# Patient Record
Sex: MALE | Race: AMERICAN INDIAN OR ALASKA NATIVE
[De-identification: names, ages, dates, MRNs, and addresses within clinical notes are randomized per-mention and may not be internally consistent; named-entity substitution may affect disease eponyms.]

---

## 2018-11-15 ENCOUNTER — HOSPITAL ENCOUNTER (INPATIENT)
Dept: HOSPITAL 31 - C.ER | Age: 58
LOS: 8 days | Discharge: NURSING, REHAB OR LONG-TERM CARE | DRG: 566 | End: 2018-11-23
Attending: INTERNAL MEDICINE | Admitting: INTERNAL MEDICINE
Payer: COMMERCIAL

## 2018-11-15 VITALS — BODY MASS INDEX: 25 KG/M2

## 2018-11-15 DIAGNOSIS — E11.69: Primary | ICD-10-CM

## 2018-11-15 DIAGNOSIS — L97.529: ICD-10-CM

## 2018-11-15 DIAGNOSIS — D69.6: ICD-10-CM

## 2018-11-15 DIAGNOSIS — E11.621: ICD-10-CM

## 2018-11-15 DIAGNOSIS — I10: ICD-10-CM

## 2018-11-15 DIAGNOSIS — M20.10: ICD-10-CM

## 2018-11-15 DIAGNOSIS — E78.00: ICD-10-CM

## 2018-11-15 DIAGNOSIS — Z87.891: ICD-10-CM

## 2018-11-15 DIAGNOSIS — Z89.411: ICD-10-CM

## 2018-11-15 DIAGNOSIS — M86.172: ICD-10-CM

## 2018-11-15 DIAGNOSIS — Z86.73: ICD-10-CM

## 2018-11-15 DIAGNOSIS — F10.129: ICD-10-CM

## 2018-11-15 LAB
BASOPHILS # BLD AUTO: 0.1 K/UL (ref 0–0.2)
BASOPHILS NFR BLD: 0.9 % (ref 0–2)
BUN SERPL-MCNC: 10 MG/DL (ref 9–20)
CALCIUM SERPL-MCNC: 8.4 MG/DL (ref 8.6–10.4)
EOSINOPHIL # BLD AUTO: 0.1 K/UL (ref 0–0.7)
EOSINOPHIL NFR BLD: 1.1 % (ref 0–4)
ERYTHROCYTE [DISTWIDTH] IN BLOOD BY AUTOMATED COUNT: 15.2 % (ref 11.5–14.5)
GFR NON-AFRICAN AMERICAN: > 60
HGB BLD-MCNC: 12.8 G/DL (ref 12–18)
LYMPHOCYTES # BLD AUTO: 1.7 K/UL (ref 1–4.3)
LYMPHOCYTES NFR BLD AUTO: 24.6 % (ref 20–40)
MCH RBC QN AUTO: 33.1 PG (ref 27–31)
MCHC RBC AUTO-ENTMCNC: 34 G/DL (ref 33–37)
MCV RBC AUTO: 97.2 FL (ref 80–94)
MONOCYTES # BLD: 0.6 K/UL (ref 0–0.8)
MONOCYTES NFR BLD: 8.8 % (ref 0–10)
NEUTROPHILS # BLD: 4.5 K/UL (ref 1.8–7)
NEUTROPHILS NFR BLD AUTO: 64.6 % (ref 50–75)
NRBC BLD AUTO-RTO: 0.1 % (ref 0–2)
PLATELET # BLD: 128 K/UL (ref 130–400)
PMV BLD AUTO: 9.3 FL (ref 7.2–11.7)
RBC # BLD AUTO: 3.88 MIL/UL (ref 4.4–5.9)
WBC # BLD AUTO: 6.9 K/UL (ref 4.8–10.8)

## 2018-11-15 RX ADMIN — TAZOBACTAM SODIUM AND PIPERACILLIN SODIUM SCH MLS/HR: 375; 3 INJECTION, SOLUTION INTRAVENOUS at 20:10

## 2018-11-15 RX ADMIN — ENOXAPARIN SODIUM SCH: 40 INJECTION SUBCUTANEOUS at 20:11
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
## 2018-11-15 NOTE — CP.PCM.CON
History of Present Illness





- History of Present Illness


History of Present Illness: 


Podiatry Consult Note for Dr. Figueroa





58M seen in ED after being sent in from Dr. Figueroa's office for left hallux 

ulceration. Patient states that he has had the ulceration on and off for months 

and that sometimes he notices bad smells coming from it. He denies any recent 

redness, malodor, or drainage. Patient states that he has had bone infections of

his feet in the past that has led to amputation. He is AAO x 3 and NAD at time 

of visit. Denies any further pedal complaints. Denies any recent 

N/V/F/C/CP/SOB/D





Review of Systems





- Review of Systems


All systems: reviewed and no additional remarkable complaints except


Review of Systems: 





as per HPI





Past Patient History





- Past Medical History & Family History


Past Medical History?: Yes





- Past Social History


Smoking Status: Smoker Currrent Status Unknown





- CARDIAC


Hx Hypercholesterolemia: Yes


Hx Hypertension: Yes





- PULMONARY


Hx Tuberculosis: No





- NEUROLOGICAL


Hx Neurological Disorder: Yes


HX Cerebrovascular Accident: Yes ("TWO STROKES")





- ENDOCRINE/METABOLIC


Hx Endocrine Disorders: Yes


Hx Diabetes Mellitus Type 2: Yes





- HEMATOLOGICAL/ONCOLOGICAL


Hx Cancer: No





- INTEGUMENTARY


Hx Dermatological Problems: Yes





- MUSCULOSKELETAL/RHEUMATOLOGICAL


Hx Musculoskeletal Disorders: Yes


Hx Falls: No


Hx Unsteady Gait: Yes


Other/Comment: w/ bilateral foot amputation, use quad cane for walking





- GASTROINTESTINAL


Hx Gastrointestinal Disorders: No





- GENITOURINARY/GYNECOLOGICAL


Hx Genitourinary Disorders: No





- PSYCHIATRIC


Hx Substance Use: No





- SURGICAL HISTORY


Hx Surgeries: Yes


Hx Herniorrhaphy: Yes


Other/Comment: W surgery





- ANESTHESIA


Hx Anesthesia: Yes


Hx Anesthesia Reactions: No





Meds


Allergies/Adverse Reactions: 


                                    Allergies











Allergy/AdvReac Type Severity Reaction Status Date / Time


 


No Known Allergies Allergy   Verified 11/15/18 15:10














- Medications


Medications: 


                               Current Medications





Enoxaparin Sodium (Lovenox)  40 mg SC DAILY [facility]


   Last Admin: 11/15/18 20:11 Dose:  Not Given


Vancomycin HCl 1 gm/ Sodium (Chloride)  250 mls @ 166.7 mls/hr IVPB Q24H [facility]; 

Protocol


Piperacillin Sod/Tazobactam Sod (Zosyn 3.375 Gm Iv Premix)  3.375 gm in 50 mls @

100 mls/hr IVPB Q8H [facility]; Protocol


   Last Admin: 11/15/18 20:10 Dose:  100 mls/hr


Pneumococcal Polyvalent Vaccine (Pneumovax 23 Vaccine)  0.5 ml IM .ONCE ONE


   Stop: 11/17/18 10:01











Physical Exam





- Constitutional


Appears: Well, Non-toxic, No Acute Distress





- Extremities Exam


Additional comments: 





LLE focused exam





Vasc: DP/PT pulses palpable 1/4. Skin temperature warm to warm from proximal to 

distal WNL. CFT < 3 seconds to all digits. Minimal edema noted to hallux


Neuro: Epicritic and protective sensation grossly diminished


Derm: Superficial ulceration with partially overlying callous formation noted to

plantar aspect of left hallux. No malodor, drainage, probe to bone, tracking, 

tunneling or undermining. No other clinical signs of infection appreciated


MSK: Minimal POP to plantar aspect of hallux. Previous partial amputation of l

eft second and third digits appreciated





- Neurological Exam


Neurological exam: Alert, Oriented x3





- Psychiatric Exam


Psychiatric exam: Normal Affect, Normal Mood





Results





- Vital Signs


Recent Vital Signs: 


                                Last Vital Signs











Temp  98.2 F   11/15/18 17:19


 


Pulse  91 H  11/15/18 17:19


 


Resp  19   11/15/18 17:19


 


BP  111/73   11/15/18 17:19


 


Pulse Ox  99   11/15/18 17:19














- Labs


Result Diagrams: 


                                 11/15/18 16:22





                                 11/15/18 16:22


Labs: 


                         Laboratory Results - last 24 hr











  11/15/18 11/15/18 11/15/18





  15:27 16:22 16:22


 


WBC   6.9 


 


RBC   3.88 L 


 


Hgb   12.8  D 


 


Hct   37.7 


 


MCV   97.2 H 


 


MCH   33.1 H 


 


MCHC   34.0 


 


RDW   15.2 H 


 


Plt Count   128 L 


 


MPV   9.3 


 


Neut % (Auto)   64.6 


 


Lymph % (Auto)   24.6 


 


Mono % (Auto)   8.8 


 


Eos % (Auto)   1.1 


 


Baso % (Auto)   0.9 


 


Neut # (Auto)   4.5 


 


Lymph # (Auto)   1.7 


 


Mono # (Auto)   0.6 


 


Eos # (Auto)   0.1 


 


Baso # (Auto)   0.1 


 


Differential Comment    


 


Sodium    143


 


Potassium    3.7


 


Chloride    101


 


Carbon Dioxide    27


 


Anion Gap    19


 


BUN    10


 


Creatinine    0.6 L


 


Est GFR ( Amer)    > 60


 


Est GFR (Non-Af Amer)    > 60


 


POC Glucose (mg/dL)  128 H  


 


Random Glucose    93


 


Calcium    8.4 L














Assessment & Plan





- Assessment and Plan (Free Text)


Assessment: 





58M seen for ulceration of plantar left hallux


Plan: 





Patient seen and evaluated


Plan discussed with Dr. Figueroa


Patient to be admitted to floors


Afebrile, absent leukocytosis


ID consult placed, recs appreciated


IV abx per ID


Wound cx pending


L foot xray: On the lateral view of the great toe plantar cortical irregu

larities concerning for contiguous osteomyelitis and contiguous cellulitis is 

suspect.  Correlate clinically.  The frontal view shows prominent exostosis of 

the medial great toe distal phalanx.


Vascular consult placed to assess vascularity to lower extremity


Possible plan for surgical intervention pending vascular studies


Wound dressed with DSD


Podiatry will continue to follow while patient in house





- Date & Time


Date: 11/15/18


Time: 21:02
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
## 2018-11-15 NOTE — RAD
PROCEDURE:  Radiographs of the left great toe.



TECHNIQUE::  AP radiograph of the left foot, with oblique and lateral 

view of the left great toe.



COMPARISON:

None.



FINDINGS:



BONES:

At the 2nd and 3rd interphalangeal joint levels just proximal to that 

amputation status is noted.  No acute fracture seen.



On the lateral view of the great toe plantar cortical irregularities 

concerning for contiguous osteomyelitis and contiguous cellulitis is 

suspect.  Correlate clinically.  The frontal view shows prominent 

exostosis of the medial great toe distal phalanx.



1st metatarsal-phalangeal joint arthrosis 



Flexion deformity of the 4th DIP joint 



JOINTS:

Normal. 



SOFT TISSUES:

Normal. 



OTHER FINDINGS:

None.



IMPRESSION:

On the lateral view of the great toe plantar cortical irregularities 

concerning for contiguous osteomyelitis and contiguous cellulitis is 

suspect.  Correlate clinically.  The frontal view shows prominent 

exostosis of the medial great toe distal phalanx.



Other findings as above. 



Comments: Study marked for PA review .

## 2018-11-15 NOTE — C.PDOC
History Of Present Illness


57 y/o male presents to ED with c/o left 1st toe diabetic ulcer for "1 year" 

worse for 1 month. Patient took unknown antibiotic and finished 1 month ago with

no improvement. Patient saw Dr. Basilio who advised patient to come to ED for 

admission. Patient denies fever, chills, trauma or any other complaints at this 

time. 


Time Seen by Provider: 11/15/18 15:26


Chief Complaint (Nursing): Abnormal Skin Integrity


History Per: Patient


History/Exam Limitations: no limitations


Onset/Duration Of Symptoms: Days


Current Symptoms Are (Timing): Still Present





Past Medical History


Reviewed: Historical Data, Nursing Documentation, Vital Signs


Vital Signs: 





                                Last Vital Signs











Temp  98.6 F   11/15/18 15:12


 


Pulse  85   11/15/18 15:12


 


Resp  18   11/15/18 15:12


 


BP  108/74   11/15/18 15:12


 


Pulse Ox  97   11/15/18 15:12














- Medical History


PMH: HTN, Hypercholesterolemia


Surgical History: No Surg Hx


Family History: States: No Known Family Hx





- Social History


Hx Tobacco Use: Yes


Hx Alcohol Use: Yes


Hx Substance Use: No





- Immunization History


Hx Tetanus Toxoid Vaccination: No


Hx Influenza Vaccination: Yes


Hx Pneumococcal Vaccination: No





Review Of Systems


Constitutional: Negative for: Fever, Chills


Musculoskeletal: Positive for: Foot Pain


Skin: Positive for: Other (ulcer to 1st left toe).  Negative for: Rash





Physical Exam





- Physical Exam


Appears: Non-toxic, No Acute Distress


Skin: Warm, Dry, No Rash


Head: Atraumatic, Normacephalic


Eye(s): bilateral: Normal Inspection


Oral Mucosa: Moist


Neck: Normal ROM, Supple


Cardiovascular: Rhythm Regular


Respiratory: Normal Breath Sounds, No Rales, No Rhonchi, No Wheezing


Extremity: Capillary Refill (<2 seconds), Other (Diabetic foot ulcerabove left 

1st toe, No lymphangitis or discharge. Multiple amputations on both feet)


Extremity: Bilateral: Normal ROM


Pulses: Left Dorsalis Pedis: Normal


Neurological/Psych: Oriented x3, Normal Speech, Normal Motor, Normal Sensation





ED Course And Treatment





- Laboratory Results


Result Diagrams: 


                                 11/15/18 16:22





                                 11/15/18 16:22


O2 Sat by Pulse Oximetry: 97 (RA)


Pulse Ox Interpretation: Normal





- Radiology


CXR: Interpreted by Me


CXR Interpretation: Yes: No Acute Disease





- Other Rad


  ** L 1 TOE


X-Ray: Interpreted by Me ( DISTAL PHALANX DECORT NO FX)





Progress





- Re-Evaluation


Re-evaluation Note: 





11/15/18 15:56


D/W POD RESIDENT WILL EVAL IN ER


11/15/18 17:01


D/W DR ASHLEY UNDERWOOD C/F PMD WILL ADMIT








- Data Reviewed


Data Reviewed: Lab, Diagnostic imaging, Old records





Disposition


Counseled Patient/Family Regarding: Studies Performed, Diagnosis





- Disposition


Disposition: HOSPITALIZED


Disposition Time: 17:01


Condition: SERIOUS


Forms:  [facility] (English)





- POA


Present On Arrival: None





- Clinical Impression


Clinical Impression: 


 Toe ulcer, Osteomyelitis








- Scribe Statement


The provider has reviewed the documentation as recorded by the Jacquelineibjo-ann Ge





All medical record entries made by the Jacquelineibjo-ann were at my direction and 

personally dictated by me. I have reviewed the chart and agree that the record 

accurately reflects my personal performance of the history, physical exam, medic

al decision making, and the department course for this patient. I have also 

personally directed, reviewed, and agree with the discharge instructions and 

disposition.

## 2018-11-15 NOTE — RAD
Date of service: 



11/15/2018



HISTORY:

 MED CLEAR 



COMPARISON:

12/9/2014



TECHNIQUE:

PA and lateral three views 



FINDINGS:



LUNGS:

Bilateral hyperaeration.  Trace left basal thread-like 

atelectasis/scarring.



No interval consolidation.



PLEURA:

No significant pleural effusion identified. No pneumothorax apparent.



CARDIOVASCULAR:

There is presence of aortic atherosclerotic calcification on x-ray.



Mild cardiomegaly no pulmonary vascular congestion. 



OSSEOUS STRUCTURES:

Exuberant thoraco lumbar spondylosis.  Calcification ossification of 

anterior longitudinal ligament.  Diffuse idiopathic skeletal 

hyperostoses DISH is inferred 



VISUALIZED UPPER ABDOMEN:

Normal.



OTHER FINDINGS:

None.



IMPRESSION:

No acute infiltrate. 



Other findings as above.

## 2018-11-16 LAB
ALBUMIN SERPL-MCNC: 4.2 G/DL (ref 3.5–5)
ALBUMIN/GLOB SERPL: 1.3 {RATIO} (ref 1–2.1)
ALT SERPL-CCNC: 39 U/L (ref 21–72)
APTT BLD: 29 SECONDS (ref 21–34)
AST SERPL-CCNC: 69 U/L (ref 17–59)
BASOPHILS # BLD AUTO: 0.1 K/UL (ref 0–0.2)
BASOPHILS NFR BLD: 0.8 % (ref 0–2)
BUN SERPL-MCNC: 10 MG/DL (ref 9–20)
CALCIUM SERPL-MCNC: 8.8 MG/DL (ref 8.6–10.4)
EOSINOPHIL # BLD AUTO: 0.2 K/UL (ref 0–0.7)
EOSINOPHIL NFR BLD: 1.8 % (ref 0–4)
ERYTHROCYTE [DISTWIDTH] IN BLOOD BY AUTOMATED COUNT: 15 % (ref 11.5–14.5)
ERYTHROCYTE [SEDIMENTATION RATE] IN BLOOD: 9 MM/HR (ref 0–15)
GFR NON-AFRICAN AMERICAN: > 60
HEPATITIS A IGM: NEGATIVE
HEPATITIS B CORE AB: NEGATIVE
HEPATITIS C ANTIBODY: NEGATIVE
HGB BLD-MCNC: 13.5 G/DL (ref 12–18)
INR PPP: 1
LYMPHOCYTES # BLD AUTO: 1.4 K/UL (ref 1–4.3)
LYMPHOCYTES NFR BLD AUTO: 15.8 % (ref 20–40)
MCH RBC QN AUTO: 33.4 PG (ref 27–31)
MCHC RBC AUTO-ENTMCNC: 34.1 G/DL (ref 33–37)
MCV RBC AUTO: 98.1 FL (ref 80–94)
MONOCYTES # BLD: 0.9 K/UL (ref 0–0.8)
MONOCYTES NFR BLD: 10.3 % (ref 0–10)
NEUTROPHILS # BLD: 6.2 K/UL (ref 1.8–7)
NEUTROPHILS NFR BLD AUTO: 71.3 % (ref 50–75)
NRBC BLD AUTO-RTO: 0 % (ref 0–2)
PLATELET # BLD: 130 K/UL (ref 130–400)
PMV BLD AUTO: 9.8 FL (ref 7.2–11.7)
PROTHROMBIN TIME: 11.2 SECONDS (ref 9.7–12.2)
RBC # BLD AUTO: 4.05 MIL/UL (ref 4.4–5.9)
WBC # BLD AUTO: 8.7 K/UL (ref 4.8–10.8)

## 2018-11-16 RX ADMIN — TAZOBACTAM SODIUM AND PIPERACILLIN SODIUM SCH MLS/HR: 375; 3 INJECTION, SOLUTION INTRAVENOUS at 19:40

## 2018-11-16 RX ADMIN — TAZOBACTAM SODIUM AND PIPERACILLIN SODIUM SCH MLS/HR: 375; 3 INJECTION, SOLUTION INTRAVENOUS at 10:00

## 2018-11-16 RX ADMIN — Medication SCH TAB: at 12:25

## 2018-11-16 RX ADMIN — TAZOBACTAM SODIUM AND PIPERACILLIN SODIUM SCH MLS/HR: 375; 3 INJECTION, SOLUTION INTRAVENOUS at 03:00

## 2018-11-16 RX ADMIN — ENOXAPARIN SODIUM SCH MG: 40 INJECTION SUBCUTANEOUS at 10:01

## 2018-11-16 NOTE — CP.PCM.PN
Subjective





- Date & Time of Evaluation


Date of Evaluation: 11/16/18


Time of Evaluation: 12:49





- Subjective


Subjective: 





Podiatry Consult Note for Dr. Figuerao





58M seen and evaluated at bedside with Dr. Figueroa for left plantar hallux 

ulceration. Patient is AAO x 3 and NAD, resting comfortably in bed at time of 

visit. Denies any pain to his foot or any new pedal complaints at this time. 

Denies any pain to his foot. Denies any recent N/V/F/C/CP/SOB/D





Objective





- Vital Signs/Intake and Output


Vital Signs (last 24 hours): 


                                        











Temp Pulse Resp BP Pulse Ox


 


 98 F   84   20   153/98 H  96 


 


 11/16/18 07:43  11/16/18 07:43  11/16/18 07:43  11/16/18 07:43  11/16/18 07:43








Intake and Output: 


                                        











 11/16/18 11/16/18





 06:59 18:59


 


Intake Total 200 


 


Balance 200 














- Medications


Medications: 


                               Current Medications





Acetaminophen (Tylenol 325mg Tab)  650 mg PO Q6 PRN


   PRN Reason: Fever >100.4 F


Amlodipine Besylate (Norvasc)  5 mg PO DAILY [facility]


   Last Admin: 11/16/18 12:25 Dose:  5 mg


Ascorbic Acid (Vitamin C 500 Mg Tab)  500 mg PO DAILY [facility]


   Last Admin: 11/16/18 12:25 Dose:  500 mg


Enoxaparin Sodium (Lovenox)  40 mg SC DAILY [facility]


   Last Admin: 11/16/18 10:01 Dose:  Not Given


Gabapentin (Neurontin)  300 mg PO HS [facility]


Vancomycin HCl 1 gm/ Sodium (Chloride)  250 mls @ 166.7 mls/hr IVPB Q24H [facility]; 

Protocol


Piperacillin Sod/Tazobactam Sod (Zosyn 3.375 Gm Iv Premix)  3.375 gm in 50 mls @

100 mls/hr IVPB Q8H [facility]; Protocol


   Last Admin: 11/16/18 10:00 Dose:  100 mls/hr


Multivitamins (Hexavitamin)  1 tab PO DAILY [facility]


   Last Admin: 11/16/18 12:25 Dose:  1 tab


Pneumococcal Polyvalent Vaccine (Pneumovax 23 Vaccine)  0.5 ml IM .ONCE ONE


   Stop: 11/17/18 10:01











- Labs


Labs: 


                                        





                                 11/16/18 11:24 





                                 11/16/18 11:24 





                                        











PT  11.2 SECONDS (9.7-12.2)   11/16/18  11:24    


 


INR  1.0   11/16/18  11:24    


 


APTT  29 SECONDS (21-34)   11/16/18  11:24    














- Constitutional


Appears: Well, Non-toxic, No Acute Distress





- Extremities Exam


Additional comments: 





LLE focused exam:





Vasc: DP/PT pulses palpable 2/4. Skin temperature warm to warm from proximal to 

distal WNL. CFT < 3 seconds to all digits. Minimal edema noted to hallux


Neuro: Epicritic and protective sensation grossly diminished


Derm: Superficial ulceration with partially overlying callous formation noted to

plantar aspect of left hallux. Minimal serous drainage appreciated. No malodor, 

probe to bone, tracking, tunneling or undermining. No other clinical signs of 

infection appreciated


MSK: Minimal POP to plantar aspect of hallux. Previous partial amputation of 

left second and third digits appreciated





- Neurological Exam


Neurological Exam: Alert, Awake, Oriented x3





- Psychiatric Exam


Psychiatric exam: Normal Affect, Normal Mood





Assessment and Plan





- Assessment and Plan (Free Text)


Assessment: 





58M seen and evaluated at bedside with Dr. Figueroa for left plantar hallux 

ulceration.


Plan: 





Patient seen and evaluated with Dr. Figueroa


Afebrile, absent leukocytosis


Continue IV abx per ID


F/u Vascular studies


F/u ESR


F/u Wound cx


L foot xray: On the lateral view of the great toe plantar cortical irre

gularities concerning for contiguous osteomyelitis and contiguous cellulitis is 

suspect.  Correlate clinically.  The frontal view shows prominent exostosis of 

the medial great toe distal phalanx.


Patient refusing MRI


No plan for surgical intervention at this time


Dr. Figueroa would like patient placed in [facility] for continued IV abx


Podiatry will continue to follow while patient in house

## 2018-11-16 NOTE — CP.PCM.PN
Subjective





- Date & Time of Evaluation


Date of Evaluation: 11/16/18


Time of Evaluation: 09:58





- Subjective


Subjective: 


Vascular Surgery Progress Note for Dr. Nails





This 58M was seen and examined this Am at bedside no acute events reported 

overnight. He reports that his legs and arms feel cold and that they hurt when 

he walks otherwise no complaints. 








Objective





- Vital Signs/Intake and Output


Vital Signs (last 24 hours): 


                                        











Temp Pulse Resp BP Pulse Ox


 


 98 F   84   20   153/98 H  96 


 


 11/16/18 07:43  11/16/18 07:43  11/16/18 07:43  11/16/18 07:43  11/16/18 07:43








Intake and Output: 


                                        











 11/16/18 11/16/18





 06:59 18:59


 


Intake Total 200 


 


Balance 200 














- Medications


Medications: 


                               Current Medications





Acetaminophen (Tylenol 325mg Tab)  650 mg PO Q6 PRN


   PRN Reason: Fever >100.4 F


Enoxaparin Sodium (Lovenox)  40 mg SC DAILY [facility]


   Last Admin: 11/15/18 20:11 Dose:  Not Given


Vancomycin HCl 1 gm/ Sodium (Chloride)  250 mls @ 166.7 mls/hr IVPB Q24H [facility]; 

Protocol


Piperacillin Sod/Tazobactam Sod (Zosyn 3.375 Gm Iv Premix)  3.375 gm in 50 mls @

100 mls/hr IVPB Q8H [facility]; Protocol


   Last Admin: 11/15/18 20:10 Dose:  100 mls/hr


Insulin Human Regular (Novolin R)  0 unit SC ACHS [facility]; Protocol


Pneumococcal Polyvalent Vaccine (Pneumovax 23 Vaccine)  0.5 ml IM .ONCE ONE


   Stop: 11/17/18 10:01











- Labs


Labs: 


                                        





                                 11/15/18 16:22 





                                 11/15/18 16:22 











- Constitutional


Appears: Non-toxic, No Acute Distress





- Head Exam


Head Exam: ATRAUMATIC, NORMOCEPHALIC





- Eye Exam


Eye Exam: EOMI





- ENT Exam


ENT Exam: Mucous Membranes Moist





- Respiratory Exam


Respiratory Exam: NORMAL BREATHING PATTERN





- Cardiovascular Exam


Cardiovascular Exam: +S1, +S2





- GI/Abdominal Exam


GI & Abdominal Exam: Soft.  absent: Tenderness





- Extremities Exam


Additional comments: 





LLE DP and PT palpable 2+ 





- Neurological Exam


Neurological Exam: Alert, Awake





- Psychiatric Exam


Psychiatric exam: Normal Affect, Normal Mood





- Skin


Skin Exam: Dry, Intact





Assessment and Plan





- Assessment and Plan (Free Text)


Assessment: 


58M with Right hallux ulcer





Followup vascular studies


Continue medical management per primary team


continue management per podiatry.


Further recs per Dr. Jesu Cisneros PGY3

## 2018-11-16 NOTE — CP.PCM.CON
History of Present Illness





- History of Present Illness


History of Present Illness: 





58M  was sent to  by his podiatrist for ulcer of L hallux. Pt states he has 

had a small ulcer on his L great toe for some time now   He has been seeing his 

podiatrist and was told to come to the hospital for further evaluation of his 

vascular disease. 








ID CONSULTED FOR ANTIBIOTIC MANAGEMENT   PODIATRY AND VASCULAR ON BOARD  IMAGING

AND CULTURES PENDING 





PMHx: HTN


PSHx: L inguinal hernia rx, R hallux amputation


SocialHx: denies smoking/EtOH/drugs





Review of Systems





- Review of Systems


All systems: reviewed and no additional remarkable complaints except





- Constitutional


Constitutional: As Per HPI





- EENT


Eyes: absent: As Per HPI, Blind Spots, Blurred Vision, Change in Vision, Decre

ased Night Vision, Diplopia, Discharge, Dry Eye, Exophthalmos, Floaters, 

Irritation, Itchy Eyes, Loss of Peripheral Vision, Pain, Photophobia, Requires 

Corrective Lenses, Sees Flashes, Spots in Vision, Tunnel Vision, Other Visual 

Disturbances, Loss of Vision, Other


Ears: absent: As Per HPI, Decreased Hearing, Ear Discharge, Ear Pain, Tinnitus, 

Abnormal Hearing, Disequilibrium, Dizziness, Other


Nose/Mouth/Throat: absent: As Per HPI, Epistaxis, Nasal Congestion, Nasal 

Discharge, Nasal Obstruction, Nasal Trauma, Nose Pain, Post Nasal Drip, Sinus P

ain, Sinus Pressure, Bleeding Gums, Change in Voice, Dental Pain, Dry Mouth, 

Dysphagia, Halitosis, Hoarsness, Lip Swelling, Mouth Lesions, Mouth Pain, 

Odynophagia, Sore Throat, Throat Swelling, Tongue Swelling, Facial Pain, Neck 

Pain, Neck Mass, Other





- Cardiovascular


Cardiovascular: absent: As Per HPI, Acrocyanosis, Chest Pain, Chest Pain at 

Rest, Chest Pain with Activity, Claudication, Diaphoresis, Dyspnea, Dyspnea on 

Exertion, Edema, Irregular Heart Rhythm, Pain Radiating to Arm/Neck/Jaw, Leg 

Edema, Leg Ulcers, Lightheadedness, Orthopnea, Palpitations, Paroxysmal 

Nocturnal Dyspnea, Pedal Edema, Radiating Pain, Rapid Heart Rate, Slow Heart 

Rate, Syncope, Other





- Respiratory


Respiratory: absent: As Per HPI, Cough, Dyspnea, Hemoptysis, Dyspnea on 

Exertion, Wheezing, Snoring, Stridor, Pain on Inspiration, Chest Congestion, 

Excessive Mucous Production, Change in Mucous Color, Pain with Coughing, Other





- Genitourinary


Genitourinary: absent: As Per HPI, Change in Urinary Stream, Difficulty 

Urinating, Dysuria, Flank Pain, Hematuria, Pyuria, Nocturia, Urinary 

Incontinence, Urinary Frequency, Urinary Hesitance, Urinary Urgency, Voiding 

Freq/Small Amts, Freq UTI, Hx Renal/Bladder Calculi, Hx /Renal Surgery, 

Bladder Distension, Other





- Musculoskeletal


Musculoskeletal: As Per HPI





- Integumentary


Integumentary: As Per HPI





- Neurological


Neurological: absent: As Per HPI, Abnormal Gait, Abnormal Hearing, Abnormal 

Movements, Abnormal Speech, Behavioral Changes, Burning Sensations, Confusion, 

Convulsions, Disequilibrium, Dizziness, Numbness, Focal Weakness, Frequent 

Falls, Headaches, Lack of Coordination, Loss of Vision, Memory Loss, 

Paresthesias, Radicular Pain, Restless Legs, Sensory Deficit, Syncope, Tingling,

Tremor, Vertigo, Weakness, Other Visual Disturbances, Other





Past Patient History





- Past Medical History & Family History


Past Medical History?: Yes





- Past Social History


Smoking Status: Smoker Currrent Status Unknown





- CARDIAC


Hx Hypertension: Yes





- PULMONARY


Hx Tuberculosis: No





- NEUROLOGICAL


Hx Neurological Disorder: Yes


HX Cerebrovascular Accident: Yes ("TWO STROKES")





- ENDOCRINE/METABOLIC


Hx Endocrine Disorders: Yes


Hx Diabetes Mellitus Type 2: Yes





- HEMATOLOGICAL/ONCOLOGICAL


Hx Cancer: No





- INTEGUMENTARY


Hx Dermatological Problems: Yes





- MUSCULOSKELETAL/RHEUMATOLOGICAL


Hx Musculoskeletal Disorders: Yes


Hx Falls: No


Hx Unsteady Gait: Yes


Other/Comment: w/ bilateral foot amputation, use quad cane for walking





- GASTROINTESTINAL


Hx Gastrointestinal Disorders: No





- GENITOURINARY/GYNECOLOGICAL


Hx Genitourinary Disorders: No





- PSYCHIATRIC


Hx Substance Use: No





- SURGICAL HISTORY


Hx Surgeries: Yes


Hx Herniorrhaphy: Yes


Other/Comment: W surgery





- ANESTHESIA


Hx Anesthesia: Yes


Hx Anesthesia Reactions: No





Meds


Allergies/Adverse Reactions: 


                                    Allergies











Allergy/AdvReac Type Severity Reaction Status Date / Time


 


No Known Allergies Allergy   Verified 11/15/18 15:10














- Medications


Medications: 


                               Current Medications





Acetaminophen (Tylenol 325mg Tab)  650 mg PO Q6 PRN


   PRN Reason: Fever >100.4 F


Amlodipine Besylate (Norvasc)  5 mg PO DAILY [facility]


   Last Admin: 11/16/18 12:25 Dose:  5 mg


Ascorbic Acid (Vitamin C 500 Mg Tab)  500 mg PO DAILY [facility]


   Last Admin: 11/16/18 12:25 Dose:  500 mg


Enoxaparin Sodium (Lovenox)  40 mg SC DAILY [facility]


   Last Admin: 11/16/18 10:01 Dose:  Not Given


Gabapentin (Neurontin)  300 mg PO HS [facility]


Vancomycin HCl 1 gm/ Sodium (Chloride)  250 mls @ 166.7 mls/hr IVPB Q24H [facility]; 

Protocol


Piperacillin Sod/Tazobactam Sod (Zosyn 3.375 Gm Iv Premix)  3.375 gm in 50 mls @

100 mls/hr IVPB Q8H [facility]; Protocol


   Last Admin: 11/16/18 10:00 Dose:  100 mls/hr


Multivitamins (Hexavitamin)  1 tab PO DAILY [facility]


   Last Admin: 11/16/18 12:25 Dose:  1 tab


Pneumococcal Polyvalent Vaccine (Pneumovax 23 Vaccine)  0.5 ml IM .ONCE ONE


   Stop: 11/17/18 10:01











Physical Exam





- Constitutional


Appears: Non-toxic, Chronically Ill





- Head Exam


Head Exam: NORMOCEPHALIC





- Eye Exam


Eye Exam: absent: Scleral icterus





- ENT Exam


ENT Exam: Mucous Membranes Dry





- Neck Exam


Neck exam: Negative for: Lymphadenopathy





- Respiratory Exam


Respiratory Exam: Decreased Breath Sounds





- Cardiovascular Exam


Cardiovascular Exam: REGULAR RHYTHM





- GI/Abdominal Exam


GI & Abdominal Exam: Diminished Bowel Sounds, Soft





- Rectal Exam


Rectal Exam: Deferred





-  Exam


 Exam: NORMAL INSPECTION





- Extremities Exam


Extremities exam: Negative for: pedal edema


Additional comments: 





+ ulcer left great toe 





- Back Exam


Back exam: absent: CVA tenderness (L), CVA tenderness (R)





- Neurological Exam


Neurological exam: Alert, CN II-XII Intact, Oriented x3, Reflexes Normal





- Psychiatric Exam


Psychiatric exam: Normal Mood





- Skin


Skin Exam: Dry





Results





- Vital Signs


Recent Vital Signs: 


                                Last Vital Signs











Temp  98 F   11/16/18 07:43


 


Pulse  84   11/16/18 07:43


 


Resp  20   11/16/18 07:43


 


BP  153/98 H  11/16/18 07:43


 


Pulse Ox  96   11/16/18 07:43














- Labs


Result Diagrams: 


                                 11/16/18 11:24





                                 11/16/18 11:24


Labs: 


                         Laboratory Results - last 24 hr











  11/15/18 11/15/18 11/15/18





  15:27 16:22 16:22


 


WBC   6.9 


 


RBC   3.88 L 


 


Hgb   12.8  D 


 


Hct   37.7 


 


MCV   97.2 H 


 


MCH   33.1 H 


 


MCHC   34.0 


 


RDW   15.2 H 


 


Plt Count   128 L 


 


MPV   9.3 


 


Neut % (Auto)   64.6 


 


Lymph % (Auto)   24.6 


 


Mono % (Auto)   8.8 


 


Eos % (Auto)   1.1 


 


Baso % (Auto)   0.9 


 


Neut # (Auto)   4.5 


 


Lymph # (Auto)   1.7 


 


Mono # (Auto)   0.6 


 


Eos # (Auto)   0.1 


 


Baso # (Auto)   0.1 


 


Differential Comment    


 


ESR   


 


PT   


 


INR   


 


APTT   


 


Sodium    143


 


Potassium    3.7


 


Chloride    101


 


Carbon Dioxide    27


 


Anion Gap    19


 


BUN    10


 


Creatinine    0.6 L


 


Est GFR ( Amer)    > 60


 


Est GFR (Non-Af Amer)    > 60


 


POC Glucose (mg/dL)  128 H  


 


Random Glucose    93


 


Hemoglobin A1c   


 


Calcium    8.4 L


 


Phosphorus   


 


Magnesium   


 


Total Bilirubin   


 


AST   


 


ALT   


 


Alkaline Phosphatase   


 


Total Protein   


 


Albumin   


 


Globulin   


 


Albumin/Globulin Ratio   














  11/15/18 11/16/18 11/16/18





  21:43 07:12 11:19


 


WBC   


 


RBC   


 


Hgb   


 


Hct   


 


MCV   


 


MCH   


 


MCHC   


 


RDW   


 


Plt Count   


 


MPV   


 


Neut % (Auto)   


 


Lymph % (Auto)   


 


Mono % (Auto)   


 


Eos % (Auto)   


 


Baso % (Auto)   


 


Neut # (Auto)   


 


Lymph # (Auto)   


 


Mono # (Auto)   


 


Eos # (Auto)   


 


Baso # (Auto)   


 


Differential Comment   


 


ESR   


 


PT   


 


INR   


 


APTT   


 


Sodium   


 


Potassium   


 


Chloride   


 


Carbon Dioxide   


 


Anion Gap   


 


BUN   


 


Creatinine   


 


Est GFR ( Amer)   


 


Est GFR (Non-Af Amer)   


 


POC Glucose (mg/dL)  118 H  91  92


 


Random Glucose   


 


Hemoglobin A1c   


 


Calcium   


 


Phosphorus   


 


Magnesium   


 


Total Bilirubin   


 


AST   


 


ALT   


 


Alkaline Phosphatase   


 


Total Protein   


 


Albumin   


 


Globulin   


 


Albumin/Globulin Ratio   














  11/16/18 11/16/18 11/16/18





  11:24 11:24 11:24


 


WBC   8.7 


 


RBC   4.05 L 


 


Hgb   13.5 


 


Hct   39.8 


 


MCV   98.1 H 


 


MCH   33.4 H 


 


MCHC   34.1 


 


RDW   15.0 H 


 


Plt Count   130 


 


MPV   9.8 


 


Neut % (Auto)   71.3 


 


Lymph % (Auto)   15.8 L 


 


Mono % (Auto)   10.3 H 


 


Eos % (Auto)   1.8 


 


Baso % (Auto)   0.8 


 


Neut # (Auto)   6.2 


 


Lymph # (Auto)   1.4 


 


Mono # (Auto)   0.9 H 


 


Eos # (Auto)   0.2 


 


Baso # (Auto)   0.1 


 


Differential Comment   


 


ESR   9 


 


PT   


 


INR   


 


APTT   


 


Sodium    139


 


Potassium    3.6


 


Chloride    98


 


Carbon Dioxide    28


 


Anion Gap    17


 


BUN    10


 


Creatinine    0.8


 


Est GFR ( Amer)    > 60


 


Est GFR (Non-Af Amer)    > 60


 


POC Glucose (mg/dL)   


 


Random Glucose    98


 


Hemoglobin A1c  5.6  


 


Calcium    8.8


 


Phosphorus    2.9


 


Magnesium    1.7


 


Total Bilirubin    0.7


 


AST    69 H


 


ALT    39


 


Alkaline Phosphatase    68


 


Total Protein    7.5


 


Albumin    4.2


 


Globulin    3.3


 


Albumin/Globulin Ratio    1.3














  11/16/18





  11:24


 


WBC 


 


RBC 


 


Hgb 


 


Hct 


 


MCV 


 


MCH 


 


MCHC 


 


RDW 


 


Plt Count 


 


MPV 


 


Neut % (Auto) 


 


Lymph % (Auto) 


 


Mono % (Auto) 


 


Eos % (Auto) 


 


Baso % (Auto) 


 


Neut # (Auto) 


 


Lymph # (Auto) 


 


Mono # (Auto) 


 


Eos # (Auto) 


 


Baso # (Auto) 


 


Differential Comment 


 


ESR 


 


PT  11.2


 


INR  1.0


 


APTT  29


 


Sodium 


 


Potassium 


 


Chloride 


 


Carbon Dioxide 


 


Anion Gap 


 


BUN 


 


Creatinine 


 


Est GFR ( Amer) 


 


Est GFR (Non-Af Amer) 


 


POC Glucose (mg/dL) 


 


Random Glucose 


 


Hemoglobin A1c 


 


Calcium 


 


Phosphorus 


 


Magnesium 


 


Total Bilirubin 


 


AST 


 


ALT 


 


Alkaline Phosphatase 


 


Total Protein 


 


Albumin 


 


Globulin 


 


Albumin/Globulin Ratio 














Assessment & Plan


(1) Osteomyelitis


Status: Acute   





(2) Toe ulcer


Status: Acute   





(3) Alcohol abuse


Status: Acute   





- Assessment and Plan (Free Text)


Assessment: 





vascular and podiatry on board  


cont iv rx as ordered


await cultures


check vanco levels

## 2018-11-16 NOTE — CP.PCM.PN
Subjective





- Date & Time of Evaluation


Date of Evaluation: 11/16/18


Time of Evaluation: 08:00





- Subjective


Subjective: 


clinically same





Objective





- Vital Signs/Intake and Output


Vital Signs (last 24 hours): 


                                        











Temp Pulse Resp BP Pulse Ox


 


 98 F   84   20   153/98 H  96 


 


 11/16/18 07:43  11/16/18 07:43  11/16/18 07:43  11/16/18 07:43  11/16/18 07:43








Intake and Output: 


                                        











 11/16/18 11/16/18





 06:59 18:59


 


Intake Total 200 


 


Balance 200 














- Medications


Medications: 


                               Current Medications





Acetaminophen (Tylenol 325mg Tab)  650 mg PO Q6 PRN


   PRN Reason: Fever >100.4 F


Amlodipine Besylate (Norvasc)  5 mg PO DAILY [facility]


   Last Admin: 11/16/18 12:25 Dose:  5 mg


Ascorbic Acid (Vitamin C 500 Mg Tab)  500 mg PO DAILY [facility]


   Last Admin: 11/16/18 12:25 Dose:  500 mg


Enoxaparin Sodium (Lovenox)  40 mg SC DAILY [facility]


   Last Admin: 11/16/18 10:01 Dose:  Not Given


Gabapentin (Neurontin)  300 mg PO HS [facility]


Vancomycin HCl 1 gm/ Sodium (Chloride)  250 mls @ 166.7 mls/hr IVPB Q24H [facility]; 

Protocol


Piperacillin Sod/Tazobactam Sod (Zosyn 3.375 Gm Iv Premix)  3.375 gm in 50 mls @

100 mls/hr IVPB Q8H [facility]; Protocol


   Last Admin: 11/16/18 10:00 Dose:  100 mls/hr


Multivitamins (Hexavitamin)  1 tab PO DAILY [facility]


   Last Admin: 11/16/18 12:25 Dose:  1 tab


Pneumococcal Polyvalent Vaccine (Pneumovax 23 Vaccine)  0.5 ml IM .ONCE ONE


   Stop: 11/17/18 10:01











- Labs


Labs: 


                                        





                                 11/16/18 11:24 





                                 11/16/18 11:24 





                                        











PT  11.2 SECONDS (9.7-12.2)   11/16/18  11:24    


 


INR  1.0   11/16/18  11:24    


 


APTT  29 SECONDS (21-34)   11/16/18  11:24    














- Constitutional


Appears: Well





- Head Exam


Head Exam: ATRAUMATIC, NORMAL INSPECTION, NORMOCEPHALIC





- Eye Exam


Eye Exam: EOMI, Normal appearance, PERRL


Pupil Exam: NORMAL ACCOMODATION, PERRL





- ENT Exam


ENT Exam: Mucous Membranes Moist, Normal Exam





- Neck Exam


Neck Exam: Full ROM, Normal Inspection.  absent: Lymphadenopathy





- Respiratory Exam


Respiratory Exam: Decreased Breath Sounds





- Cardiovascular Exam


Cardiovascular Exam: REGULAR RHYTHM, +S1, +S2





- GI/Abdominal Exam


GI & Abdominal Exam: Soft, Diminished Bowel Sounds





- Rectal Exam


Rectal Exam: Deferred

## 2018-11-16 NOTE — CP.PCM.PN
<Ruth Muller - Last Filed: 11/16/18 15:08>





Subjective





- Date & Time of Evaluation


Date of Evaluation: 11/16/18


Time of Evaluation: 15:09





- Subjective


Subjective: 





Patient admitted for Left 1st Toe foot Ulcer. 





PMHx: HTN


PSHx: Left Inguinal Hernia repair, Right Hallux amputation. 


SocialHx: Denies smoking/EtOH/illicit drug use


All: NKDA


Meds: Vitamin C 500 Daily, Percocet 5/325 PRN, Norvasc 5 Dialy, Protonix 40 

Daily, Multivitaimns, Colace 100 Daily, Vitamin B12, Folate, Gabapentin 300mg HS





Patient seen and examined at bedside. NO overnight events reported. Pain is 

controlled. Patient denies any fevers, chills, SOB, chest pain, abdominal pain, 

n/v/, changes in bowel habits, or urinary symptoms. 











Objective





- Vital Signs/Intake and Output


Vital Signs (last 24 hours): 


                                        











Temp Pulse Resp BP Pulse Ox


 


 98 F   84   20   153/98 H  96 


 


 11/16/18 07:43  11/16/18 07:43  11/16/18 07:43  11/16/18 07:43  11/16/18 07:43








Intake and Output: 


                                        











 11/16/18 11/16/18





 06:59 18:59


 


Intake Total 200 


 


Balance 200 














- Medications


Medications: 


                               Current Medications





Acetaminophen (Tylenol 325mg Tab)  650 mg PO Q6 PRN


   PRN Reason: Fever >100.4 F


Amlodipine Besylate (Norvasc)  5 mg PO DAILY [facility]


   Last Admin: 11/16/18 12:25 Dose:  5 mg


Ascorbic Acid (Vitamin C 500 Mg Tab)  500 mg PO DAILY [facility]


   Last Admin: 11/16/18 12:25 Dose:  500 mg


Enoxaparin Sodium (Lovenox)  40 mg SC DAILY [facility]


   Last Admin: 11/16/18 10:01 Dose:  Not Given


Gabapentin (Neurontin)  300 mg PO HS [facility]


Vancomycin HCl 1 gm/ Sodium (Chloride)  250 mls @ 166.7 mls/hr IVPB Q24H [facility]; 

Protocol


Piperacillin Sod/Tazobactam Sod (Zosyn 3.375 Gm Iv Premix)  3.375 gm in 50 mls @

100 mls/hr IVPB Q8H [facility]; Protocol


   Last Admin: 11/16/18 10:00 Dose:  100 mls/hr


Multivitamins (Hexavitamin)  1 tab PO DAILY [facility]


   Last Admin: 11/16/18 12:25 Dose:  1 tab


Pneumococcal Polyvalent Vaccine (Pneumovax 23 Vaccine)  0.5 ml IM .ONCE ONE


   Stop: 11/17/18 10:01











- Labs


Labs: 


                                        





                                 11/16/18 11:24 





                                 11/16/18 11:24 





                                        











PT  11.2 SECONDS (9.7-12.2)   11/16/18  11:24    


 


INR  1.0   11/16/18  11:24    


 


APTT  29 SECONDS (21-34)   11/16/18  11:24    














- Constitutional


Appears: Well, Non-toxic, No Acute Distress





- Head Exam


Head Exam: ATRAUMATIC, NORMAL INSPECTION, NORMOCEPHALIC





- Eye Exam


Eye Exam: EOMI, Normal appearance





- ENT Exam


ENT Exam: Mucous Membranes Moist





- Respiratory Exam


Respiratory Exam: Clear to Ausculation Bilateral, NORMAL BREATHING PATTERN.  

absent: Accessory Muscle Use





- Cardiovascular Exam


Cardiovascular Exam: RRR, +S1, +S2





- GI/Abdominal Exam


GI & Abdominal Exam: Soft, Normal Bowel Sounds.  absent: Tenderness





- Extremities Exam


Extremities Exam: absent: Pedal Edema


Additional comments: 


LLE focused exam:





Vasc: DP/PT pulses palpable 2/4. Skin temperature warm to warm from proximal to 

distal WNL. CFT < 3 seconds to all digits. Minimal edema noted to hallux


Neuro: Epicritic and protective sensation grossly diminished


Derm: Superficial ulceration with partially overlying callous formation noted to

plantar aspect of left hallux. Minimal serous drainage appreciated. No malodor, 

probe to bone, tracking, tunneling or undermining. No other clinical signs of 

infection appreciated


MSK: Minimal POP to plantar aspect of hallux. Previous partial amputation of 

left second and third digits appreciated





- Neurological Exam


Neurological Exam: Alert, Awake, Oriented x3





- Psychiatric Exam


Psychiatric exam: Normal Affect, Normal Mood





- Skin


Skin Exam: Dry, Intact, Normal Color, Warm





Assessment and Plan





- Assessment and Plan (Free Text)


Assessment: 


58 year old with history of HTN admitted for evaluation and treatment of Left 

1st toe Ulcer. 





Plan: 





Left Hallux Ulcer


Vascular Surgery Consulted (Dr. Nails), Recs Appreciated


ID Consulted (Dr. Del Castillo), Recs Appreciated


Podiatry Consulted (Dr. Evans), Recs appreciated 


Nursing Wound Care


Foot X-ray (Admission): On the lateral view of the great toe plantar cortical 

irregularities concerning for contiguous osteomyelitis and contiguous cellulitis

is suspect.  Correlate clinically.  The frontal view shows prominent exostosis 

of the medial great toe distal phalanx.


MRI: PENDING 


Blood Culture - PENDING | Wound Cultures - PENDING 


Meds:


   Vancomycin 1gram Daily


   Zosyn 3.375 Q8H 


   Tylenol PRN


   Vitamin C 500mg PO Daily


   Gabapentin 300mg PO HS





Elevated Liver Enzymes


Hepatitis Panel 





Hx of HTN


Meds:


   Amlodipine 5 





Proph


Lovenox


SCD's Contraindicated 


NO GI proph indicated 


Heart Healthy Diet





Patient discussed with Attending


Ruth Muller, PGY-2 














<Kam Post S - Last Filed: 11/18/18 10:36>





Objective





- Vital Signs/Intake and Output


Vital Signs (last 24 hours): 


                                        











Temp Pulse Resp BP Pulse Ox


 


 98.7 F   83   20   115/75   97 


 


 11/18/18 08:10  11/18/18 08:10  11/18/18 08:10  11/18/18 08:10  11/18/18 08:10








Intake and Output: 


                                        











 11/18/18 11/18/18





 06:59 18:59


 


Intake Total 930 500


 


Output Total 650 


 


Balance 280 500














- Medications


Medications: 


                               Current Medications





Acetaminophen (Tylenol 325mg Tab)  650 mg PO Q6 PRN


   PRN Reason: Fever >100.4 F


   Last Admin: 11/16/18 17:47 Dose:  650 mg


Amlodipine Besylate (Norvasc)  5 mg PO DAILY [facility]


   Last Admin: 11/18/18 09:40 Dose:  5 mg


Ascorbic Acid (Vitamin C 500 Mg Tab)  500 mg PO DAILY [facility]


   Last Admin: 11/18/18 09:41 Dose:  500 mg


Enoxaparin Sodium (Lovenox)  40 mg SC DAILY [facility]


   Last Admin: 11/18/18 09:41 Dose:  Not Given


Fluticasone Propionate (Flonase)  0 spr DAVE BID [facility]


   Last Admin: 11/18/18 09:42 Dose:  1 spr


Gabapentin (Neurontin)  300 mg PO HS [facility]


   Last Admin: 11/17/18 21:26 Dose:  300 mg


Vancomycin HCl 1 gm/ Sodium (Chloride)  250 mls @ 166.7 mls/hr IVPB Q24H [facility]; 

Protocol


   Last Admin: 11/17/18 17:31 Dose:  166.7 mls/hr


Piperacillin Sod/Tazobactam Sod (Zosyn 3.375 Gm Iv Premix)  3.375 gm in 50 mls @

100 mls/hr IVPB Q8H [facility]; Protocol


   Last Admin: 11/18/18 10:04 Dose:  100 mls/hr


Multivitamins (Hexavitamin)  1 tab PO DAILY [facility]


   Last Admin: 11/18/18 09:40 Dose:  1 tab











- Labs


Labs: 


                                        





                                 11/18/18 08:21 





                                 11/18/18 08:21 





                                        











PT  11.2 SECONDS (9.7-12.2)   11/16/18  11:24    


 


INR  1.0   11/16/18  11:24    


 


APTT  29 SECONDS (21-34)   11/16/18  11:24    














Assessment and Plan


(1) Osteomyelitis


Status: Acute   





(2) Toe ulcer


Status: Acute   





(3) Alcohol abuse


Status: Acute   





(4) Alcohol intoxication


Status: Acute   





(5) Hypothermia


Status: Acute   





(6) Thrombocytopenia


Status: Acute   





(7) Visit for wound check


Status: Acute   





Attending/Attestation





- Attestation


I have personally seen and examined this patient.: Yes


I have fully participated in the care of the patient.: Yes


I have reviewed all pertinent clinical information, including history, physical 

exam and plan: Yes


Notes (Text): 





11/18/18 10:36


case seen and d/w woth staff

## 2018-11-17 LAB
ALBUMIN SERPL-MCNC: 4 G/DL (ref 3.5–5)
ALBUMIN/GLOB SERPL: 1.3 {RATIO} (ref 1–2.1)
ALT SERPL-CCNC: 32 U/L (ref 21–72)
AST SERPL-CCNC: 46 U/L (ref 17–59)
BASOPHILS # BLD AUTO: 0.1 K/UL (ref 0–0.2)
BASOPHILS NFR BLD: 0.6 % (ref 0–2)
BUN SERPL-MCNC: 10 MG/DL (ref 9–20)
CALCIUM SERPL-MCNC: 9.1 MG/DL (ref 8.6–10.4)
EOSINOPHIL # BLD AUTO: 0.2 K/UL (ref 0–0.7)
EOSINOPHIL NFR BLD: 2.7 % (ref 0–4)
ERYTHROCYTE [DISTWIDTH] IN BLOOD BY AUTOMATED COUNT: 14.8 % (ref 11.5–14.5)
GFR NON-AFRICAN AMERICAN: > 60
HGB BLD-MCNC: 13.7 G/DL (ref 12–18)
LYMPHOCYTES # BLD AUTO: 1.2 K/UL (ref 1–4.3)
LYMPHOCYTES NFR BLD AUTO: 14.4 % (ref 20–40)
MCH RBC QN AUTO: 33.9 PG (ref 27–31)
MCHC RBC AUTO-ENTMCNC: 34.8 G/DL (ref 33–37)
MCV RBC AUTO: 97.3 FL (ref 80–94)
MONOCYTES # BLD: 1 K/UL (ref 0–0.8)
MONOCYTES NFR BLD: 11.4 % (ref 0–10)
NEUTROPHILS # BLD: 6.1 K/UL (ref 1.8–7)
NEUTROPHILS NFR BLD AUTO: 70.9 % (ref 50–75)
NRBC BLD AUTO-RTO: 0 % (ref 0–2)
PLATELET # BLD: 141 K/UL (ref 130–400)
PMV BLD AUTO: 10.1 FL (ref 7.2–11.7)
RBC # BLD AUTO: 4.04 MIL/UL (ref 4.4–5.9)
WBC # BLD AUTO: 8.6 K/UL (ref 4.8–10.8)

## 2018-11-17 RX ADMIN — TAZOBACTAM SODIUM AND PIPERACILLIN SODIUM SCH MLS/HR: 375; 3 INJECTION, SOLUTION INTRAVENOUS at 20:05

## 2018-11-17 RX ADMIN — TAZOBACTAM SODIUM AND PIPERACILLIN SODIUM SCH MLS/HR: 375; 3 INJECTION, SOLUTION INTRAVENOUS at 10:03

## 2018-11-17 RX ADMIN — Medication SCH TAB: at 09:48

## 2018-11-17 RX ADMIN — TAZOBACTAM SODIUM AND PIPERACILLIN SODIUM SCH MLS/HR: 375; 3 INJECTION, SOLUTION INTRAVENOUS at 03:03

## 2018-11-17 RX ADMIN — ENOXAPARIN SODIUM SCH: 40 INJECTION SUBCUTANEOUS at 09:50

## 2018-11-17 RX ADMIN — FLUTICASONE PROPIONATE SCH SPR: 50 SPRAY, METERED NASAL at 21:25

## 2018-11-17 NOTE — CP.PCM.PN
Subjective





- Date & Time of Evaluation


Date of Evaluation: 11/17/18


Time of Evaluation: 07:15





- Subjective


Subjective: 


clinically same





Objective





- Vital Signs/Intake and Output


Vital Signs (last 24 hours): 


                                        











Temp Pulse Resp BP Pulse Ox


 


 98 F   83   20   147/95 H  96 


 


 11/17/18 08:17  11/17/18 08:17  11/17/18 08:17  11/17/18 08:17  11/17/18 08:17








Intake and Output: 


                                        











 11/17/18 11/17/18





 06:59 18:59


 


Intake Total 1000 


 


Output Total 400 


 


Balance 600 














- Medications


Medications: 


                               Current Medications





Acetaminophen (Tylenol 325mg Tab)  650 mg PO Q6 PRN


   PRN Reason: Fever >100.4 F


   Last Admin: 11/16/18 17:47 Dose:  650 mg


Amlodipine Besylate (Norvasc)  5 mg PO DAILY [facility]


   Last Admin: 11/17/18 09:48 Dose:  5 mg


Ascorbic Acid (Vitamin C 500 Mg Tab)  500 mg PO DAILY [facility]


   Last Admin: 11/17/18 09:48 Dose:  500 mg


Enoxaparin Sodium (Lovenox)  40 mg SC DAILY [facility]


   Last Admin: 11/17/18 09:50 Dose:  Not Given


Fluticasone Propionate (Flonase)  0 spr DAVE BID [facility]


Gabapentin (Neurontin)  300 mg PO HS [facility]


   Last Admin: 11/16/18 21:38 Dose:  300 mg


Vancomycin HCl 1 gm/ Sodium (Chloride)  250 mls @ 166.7 mls/hr IVPB Q24H [facility]; 

Protocol


   Last Admin: 11/16/18 17:40 Dose:  166.7 mls/hr


Piperacillin Sod/Tazobactam Sod (Zosyn 3.375 Gm Iv Premix)  3.375 gm in 50 mls @

100 mls/hr IVPB Q8H [facility]; Protocol


   Last Admin: 11/17/18 10:03 Dose:  100 mls/hr


Multivitamins (Hexavitamin)  1 tab PO DAILY [facility]


   Last Admin: 11/17/18 09:48 Dose:  1 tab











- Labs


Labs: 


                                        





                                 11/17/18 07:01 





                                 11/17/18 07:01 





                                        











PT  11.2 SECONDS (9.7-12.2)   11/16/18  11:24    


 


INR  1.0   11/16/18  11:24    


 


APTT  29 SECONDS (21-34)   11/16/18  11:24    














- Constitutional


Appears: Well





- Head Exam


Head Exam: ATRAUMATIC, NORMAL INSPECTION, NORMOCEPHALIC





- Eye Exam


Eye Exam: EOMI, Normal appearance, PERRL


Pupil Exam: NORMAL ACCOMODATION, PERRL





- ENT Exam


ENT Exam: Mucous Membranes Moist, Normal Exam





- Neck Exam


Neck Exam: Full ROM, Normal Inspection.  absent: Lymphadenopathy





- Respiratory Exam


Respiratory Exam: Decreased Breath Sounds





- Cardiovascular Exam


Cardiovascular Exam: REGULAR RHYTHM, +S1, +S2





- GI/Abdominal Exam


GI & Abdominal Exam: Soft, Diminished Bowel Sounds





- Rectal Exam


Rectal Exam: Deferred





Assessment and Plan


(1) Osteomyelitis


Status: Acute   





(2) Toe ulcer


Status: Acute   





(3) Alcohol abuse


Status: Acute   





(4) Alcohol intoxication


Status: Acute   





(5) Hypothermia


Status: Acute   





(6) Thrombocytopenia


Status: Acute   





(7) Visit for wound check


Status: Acute   





- Assessment and Plan (Free Text)


Plan: 





Left Hallux Ulcer


Vascular Surgery Consulted (Dr. Nails), Recs Appreciated


ID Consulted (Dr. Del Castillo), Recs Appreciated


Podiatry Consulted (Dr. Evans), Recs appreciated 


Nursing Wound Care


Foot X-ray (Admission): On the lateral view of the great toe plantar cortical 

irregularities concerning for contiguous osteomyelitis and contiguous cellulitis

is suspect.  Correlate clinically.  The frontal view shows prominent exostosis 

of the medial great toe distal phalanx.


MRI: PENDING 


Blood Culture - PENDING | Wound Cultures - PENDING 


Meds:


   Vancomycin 1gram Daily


   Zosyn 3.375 Q8H 


   Tylenol PRN


   Vitamin C 500mg PO Daily


   Gabapentin 300mg PO HS





Elevated Liver Enzymes


Hepatitis Panel 





Hx of HTN


Meds:


   Amlodipine 5 





Proph


Lovenox


SCD's Contraindicated 


NO GI proph indicated 


Heart Healthy Diet

## 2018-11-17 NOTE — CP.PCM.PN
Subjective





- Date & Time of Evaluation


Date of Evaluation: 11/17/18


Time of Evaluation: 09:31





- Subjective


Subjective: 





Podiatry Consult Note for Dr. Figueroa





58M seen and evaluated at bedside for left plantar hallux ulceration. Seen 

resting comfortably in bed. Denies any pain to his foot or any new pedal 

complaints at this time. Denies any pain to his foot. Denies any recent 

N/V/F/C/CP/SOB/D. States he is aware of possible transfer to [facility].





Objective





- Vital Signs/Intake and Output


Vital Signs (last 24 hours): 


                                        











Temp Pulse Resp BP Pulse Ox


 


 98 F   83   20   147/95 H  96 


 


 11/17/18 08:17  11/17/18 08:17  11/17/18 08:17  11/17/18 08:17  11/17/18 08:17








Intake and Output: 


                                        











 11/17/18 11/17/18





 06:59 18:59


 


Intake Total 1000 


 


Output Total 400 


 


Balance 600 














- Medications


Medications: 


                               Current Medications





Acetaminophen (Tylenol 325mg Tab)  650 mg PO Q6 PRN


   PRN Reason: Fever >100.4 F


   Last Admin: 11/16/18 17:47 Dose:  650 mg


Amlodipine Besylate (Norvasc)  5 mg PO DAILY [facility]


   Last Admin: 11/16/18 12:25 Dose:  5 mg


Ascorbic Acid (Vitamin C 500 Mg Tab)  500 mg PO DAILY [facility]


   Last Admin: 11/16/18 12:25 Dose:  500 mg


Enoxaparin Sodium (Lovenox)  40 mg SC DAILY [facility]


   Last Admin: 11/16/18 10:01 Dose:  Not Given


Gabapentin (Neurontin)  300 mg PO [facility]


   Last Admin: 11/16/18 21:38 Dose:  300 mg


Vancomycin HCl 1 gm/ Sodium (Chloride)  250 mls @ 166.7 mls/hr IVPB Q24H [facility]; 

Protocol


   Last Admin: 11/16/18 17:40 Dose:  166.7 mls/hr


Piperacillin Sod/Tazobactam Sod (Zosyn 3.375 Gm Iv Premix)  3.375 gm in 50 mls @

100 mls/hr IVPB Q8H [facility]; Protocol


   Last Admin: 11/17/18 03:03 Dose:  100 mls/hr


Multivitamins (Hexavitamin)  1 tab PO DAILY [facility]


   Last Admin: 11/16/18 12:25 Dose:  1 tab


Pneumococcal Polyvalent Vaccine (Pneumovax 23 Vaccine)  0.5 ml IM .ONCE ONE


   Stop: 11/17/18 10:01











- Labs


Labs: 


                                        





                                 11/17/18 07:01 





                                 11/17/18 07:01 





                                        











PT  11.2 SECONDS (9.7-12.2)   11/16/18  11:24    


 


INR  1.0   11/16/18  11:24    


 


APTT  29 SECONDS (21-34)   11/16/18  11:24    














- Constitutional


Appears: Well, Non-toxic, No Acute Distress





- Head Exam


Head Exam: ATRAUMATIC, NORMOCEPHALIC





- Extremities Exam


Additional comments: 





LLE focused exam:





Vasc: DP/PT pulses palpable 2/4. Skin temperature warm to warm from proximal to 

distal WNL. CFT < 3 seconds to all digits. Minimal edema noted to hallux


Neuro: Epicritic and protective sensation grossly diminished


Derm: Superficial ulceration with partially overlying callous formation noted to

plantar aspect of left hallux. Minimal serous drainage appreciated. No malodor, 

probe to bone, tracking, tunneling or undermining. No other clinical signs of 

infection appreciated


MSK: Minimal POP to plantar aspect of hallux. Previous partial amputation of 

left second and third digits appreciated





- Neurological Exam


Neurological Exam: Alert, Awake, Oriented x3





- Psychiatric Exam


Psychiatric exam: Normal Affect, Normal Mood





Assessment and Plan





- Assessment and Plan (Free Text)


Assessment: 





58M with left plantar hallux ulceration.


Plan: 





Patient seen and evaluated with Dr. Figueroa


Afebrile, absent leukocytosis


Continue IV abx per ID


F/u Vascular studies


ESR - 9


F/u Wound cx


L foot xray: On the lateral view of the great toe plantar cortical 

irregularities concerning for contiguous osteomyelitis and contiguous cellulitis

is suspect.  Correlate clinically.  The frontal view shows prominent exostosis 

of the medial great toe distal phalanx.


Patient refusing MRI


Wound cleansed and dressed with hydrogen peroxide and DSD


No plan for surgical intervention at this time


Dr. Figueroa would like patient placed in [facility] for continued IV abx, 

patient aware and agreeable


Podiatry will continue to follow while patient in house

## 2018-11-18 LAB
ALBUMIN SERPL-MCNC: 4.1 G/DL (ref 3.5–5)
ALBUMIN/GLOB SERPL: 1.3 {RATIO} (ref 1–2.1)
ALT SERPL-CCNC: 30 U/L (ref 21–72)
AST SERPL-CCNC: 44 U/L (ref 17–59)
BASOPHILS # BLD AUTO: 0.1 K/UL (ref 0–0.2)
BASOPHILS NFR BLD: 0.7 % (ref 0–2)
BUN SERPL-MCNC: 12 MG/DL (ref 9–20)
CALCIUM SERPL-MCNC: 9.1 MG/DL (ref 8.6–10.4)
EOSINOPHIL # BLD AUTO: 0.2 K/UL (ref 0–0.7)
EOSINOPHIL NFR BLD: 2.7 % (ref 0–4)
ERYTHROCYTE [DISTWIDTH] IN BLOOD BY AUTOMATED COUNT: 14.5 % (ref 11.5–14.5)
GFR NON-AFRICAN AMERICAN: > 60
HGB BLD-MCNC: 13.7 G/DL (ref 12–18)
LYMPHOCYTES # BLD AUTO: 1.4 K/UL (ref 1–4.3)
LYMPHOCYTES NFR BLD AUTO: 18.1 % (ref 20–40)
MCH RBC QN AUTO: 33.3 PG (ref 27–31)
MCHC RBC AUTO-ENTMCNC: 34.2 G/DL (ref 33–37)
MCV RBC AUTO: 97.4 FL (ref 80–94)
MONOCYTES # BLD: 0.9 K/UL (ref 0–0.8)
MONOCYTES NFR BLD: 11.9 % (ref 0–10)
NEUTROPHILS # BLD: 5.1 K/UL (ref 1.8–7)
NEUTROPHILS NFR BLD AUTO: 66.6 % (ref 50–75)
NRBC BLD AUTO-RTO: 0 % (ref 0–2)
PLATELET # BLD: 155 K/UL (ref 130–400)
PMV BLD AUTO: 9.4 FL (ref 7.2–11.7)
RBC # BLD AUTO: 4.11 MIL/UL (ref 4.4–5.9)
WBC # BLD AUTO: 7.7 K/UL (ref 4.8–10.8)

## 2018-11-18 RX ADMIN — TAZOBACTAM SODIUM AND PIPERACILLIN SODIUM SCH MLS/HR: 375; 3 INJECTION, SOLUTION INTRAVENOUS at 10:04

## 2018-11-18 RX ADMIN — TAZOBACTAM SODIUM AND PIPERACILLIN SODIUM SCH MLS/HR: 375; 3 INJECTION, SOLUTION INTRAVENOUS at 19:29

## 2018-11-18 RX ADMIN — ENOXAPARIN SODIUM SCH: 40 INJECTION SUBCUTANEOUS at 09:41

## 2018-11-18 RX ADMIN — FLUTICASONE PROPIONATE SCH SPR: 50 SPRAY, METERED NASAL at 17:34

## 2018-11-18 RX ADMIN — TAZOBACTAM SODIUM AND PIPERACILLIN SODIUM SCH MLS/HR: 375; 3 INJECTION, SOLUTION INTRAVENOUS at 02:59

## 2018-11-18 RX ADMIN — FLUTICASONE PROPIONATE SCH SPR: 50 SPRAY, METERED NASAL at 09:42

## 2018-11-18 RX ADMIN — Medication SCH TAB: at 09:40

## 2018-11-18 NOTE — CP.PCM.PN
Subjective





- Date & Time of Evaluation


Date of Evaluation: 11/18/18


Time of Evaluation: 09:00





- Subjective


Subjective: 





58M  was sent to  by his podiatrist for ulcer of L hallux. Pt states he has 

had a small ulcer on his L great toe for some time now   He has been seeing his 

podiatrist and was told to come to the hospital for further evaluation of his 

vascular disease. 








ID CONSULTED FOR ANTIBIOTIC MANAGEMENT   PODIATRY AND VASCULAR ON BOARD  IMAGING

AND CULTURES PENDING 








Objective





- Vital Signs/Intake and Output


Vital Signs (last 24 hours): 


                                        











Temp Pulse Resp BP Pulse Ox


 


 98.2 F   68   20   130/80   98 


 


 11/18/18 16:13  11/18/18 16:13  11/18/18 16:13  11/18/18 16:13  11/18/18 16:13








Intake and Output: 


                                        











 11/18/18 11/18/18





 06:59 18:59


 


Intake Total 930 500


 


Output Total 650 


 


Balance 280 500














- Medications


Medications: 


                               Current Medications





Acetaminophen (Tylenol 325mg Tab)  650 mg PO Q6 PRN


   PRN Reason: Fever >100.4 F


   Last Admin: 11/16/18 17:47 Dose:  650 mg


Amlodipine Besylate (Norvasc)  5 mg PO DAILY [facility]


   Last Admin: 11/18/18 09:40 Dose:  5 mg


Ascorbic Acid (Vitamin C 500 Mg Tab)  500 mg PO DAILY [facility]


   Last Admin: 11/18/18 09:41 Dose:  500 mg


Enoxaparin Sodium (Lovenox)  40 mg SC DAILY [facility]


   Last Admin: 11/18/18 09:41 Dose:  Not Given


Fluticasone Propionate (Flonase)  0 spr DAVE BID [facility]


   Last Admin: 11/18/18 09:42 Dose:  1 spr


Gabapentin (Neurontin)  300 mg PO HS [facility]


   Last Admin: 11/17/18 21:26 Dose:  300 mg


Vancomycin HCl 1 gm/ Sodium (Chloride)  250 mls @ 166.7 mls/hr IVPB Q24H [facility]; 

Protocol


   Last Admin: 11/17/18 17:31 Dose:  166.7 mls/hr


Piperacillin Sod/Tazobactam Sod (Zosyn 3.375 Gm Iv Premix)  3.375 gm in 50 mls @

100 mls/hr IVPB Q8H [facility]; Protocol


   Last Admin: 11/18/18 10:04 Dose:  100 mls/hr


Multivitamins (Hexavitamin)  1 tab PO DAILY [facility]


   Last Admin: 11/18/18 09:40 Dose:  1 tab











- Labs


Labs: 


                                        





                                 11/18/18 08:21 





                                 11/18/18 08:21 





                                        











PT  11.2 SECONDS (9.7-12.2)   11/16/18  11:24    


 


INR  1.0   11/16/18  11:24    


 


APTT  29 SECONDS (21-34)   11/16/18  11:24    














- Constitutional


Appears: Non-toxic





- Head Exam


Head Exam: NORMOCEPHALIC





- Eye Exam


Eye Exam: absent: Scleral icterus





- ENT Exam


ENT Exam: Mucous Membranes Dry





- Neck Exam


Neck Exam: absent: Lymphadenopathy





- Respiratory Exam


Respiratory Exam: Decreased Breath Sounds





- Cardiovascular Exam


Cardiovascular Exam: REGULAR RHYTHM





- GI/Abdominal Exam


GI & Abdominal Exam: Distended, Soft





Assessment and Plan


(1) Osteomyelitis


Status: Acute   





(2) Toe ulcer


Status: Acute   





(3) Alcohol abuse


Status: Acute   





- Assessment and Plan (Free Text)


Assessment: 





58M  was sent to  by his podiatrist for ulcer of L hallux. Pt states he has 

had a small ulcer on his L great toe for some time now   He has been seeing his 

podiatrist and was told to come to the hospital for further evaluation of his 

vascular disease. 








ID CONSULTED FOR ANTIBIOTIC MANAGEMENT   PODIATRY AND VASCULAR ON BOARD  IMAGING

AND CULTURES PENDING

## 2018-11-18 NOTE — CP.PCM.PN
Subjective





- Date & Time of Evaluation


Date of Evaluation: 11/18/18


Time of Evaluation: 07:15





- Subjective


Subjective: 


clinically same





Objective





- Vital Signs/Intake and Output


Vital Signs (last 24 hours): 


                                        











Temp Pulse Resp BP Pulse Ox


 


 98.7 F   83   20   115/75   97 


 


 11/18/18 08:10  11/18/18 08:10  11/18/18 08:10  11/18/18 08:10  11/18/18 08:10








Intake and Output: 


                                        











 11/18/18 11/18/18





 06:59 18:59


 


Intake Total 930 500


 


Output Total 650 


 


Balance 280 500














- Medications


Medications: 


                               Current Medications





Acetaminophen (Tylenol 325mg Tab)  650 mg PO Q6 PRN


   PRN Reason: Fever >100.4 F


   Last Admin: 11/16/18 17:47 Dose:  650 mg


Amlodipine Besylate (Norvasc)  5 mg PO DAILY [facility]


   Last Admin: 11/18/18 09:40 Dose:  5 mg


Ascorbic Acid (Vitamin C 500 Mg Tab)  500 mg PO DAILY [facility]


   Last Admin: 11/18/18 09:41 Dose:  500 mg


Enoxaparin Sodium (Lovenox)  40 mg SC DAILY [facility]


   Last Admin: 11/18/18 09:41 Dose:  Not Given


Fluticasone Propionate (Flonase)  0 spr DAVE BID [facility]


   Last Admin: 11/18/18 09:42 Dose:  1 spr


Gabapentin (Neurontin)  300 mg PO HS [facility]


   Last Admin: 11/17/18 21:26 Dose:  300 mg


Vancomycin HCl 1 gm/ Sodium (Chloride)  250 mls @ 166.7 mls/hr IVPB Q24H [facility]; 

Protocol


   Last Admin: 11/17/18 17:31 Dose:  166.7 mls/hr


Piperacillin Sod/Tazobactam Sod (Zosyn 3.375 Gm Iv Premix)  3.375 gm in 50 mls @

100 mls/hr IVPB Q8H [facility]; Protocol


   Last Admin: 11/18/18 10:04 Dose:  100 mls/hr


Multivitamins (Hexavitamin)  1 tab PO DAILY [facility]


   Last Admin: 11/18/18 09:40 Dose:  1 tab











- Labs


Labs: 


                                        





                                 11/18/18 08:21 





                                 11/18/18 08:21 





                                        











PT  11.2 SECONDS (9.7-12.2)   11/16/18  11:24    


 


INR  1.0   11/16/18  11:24    


 


APTT  29 SECONDS (21-34)   11/16/18  11:24    














- Constitutional


Appears: Well





- Head Exam


Head Exam: ATRAUMATIC, NORMAL INSPECTION, NORMOCEPHALIC





- Eye Exam


Eye Exam: EOMI, Normal appearance, PERRL


Pupil Exam: NORMAL ACCOMODATION, PERRL





- ENT Exam


ENT Exam: Mucous Membranes Moist, Normal Exam





- Neck Exam


Neck Exam: Full ROM, Normal Inspection.  absent: Lymphadenopathy





- Respiratory Exam


Respiratory Exam: Decreased Breath Sounds





- Cardiovascular Exam


Cardiovascular Exam: REGULAR RHYTHM, +S1, +S2





- GI/Abdominal Exam


GI & Abdominal Exam: Tenderness, Diminished Bowel Sounds





- Rectal Exam


Rectal Exam: Deferred





Assessment and Plan


(1) Osteomyelitis


Status: Acute   





(2) Toe ulcer


Status: Acute   





(3) Alcohol abuse


Status: Acute   





(4) Alcohol intoxication


Status: Acute   





(5) Hypothermia


Status: Acute   





(6) Thrombocytopenia


Status: Acute   





(7) Visit for wound check


Status: Acute

## 2018-11-18 NOTE — CP.PCM.PN
Subjective





- Date & Time of Evaluation


Date of Evaluation: 11/18/18


Time of Evaluation: 11:16





- Subjective


Subjective: 


Vascular Surgery Progress Note for Dr. Nails





This 58M was seen and examined this Am at bedside no acute events reported 

overnight. He denies any SOB or Chest pain or any new or concerning symptoms. I 

discussed his vascular studies with him an all questions were answered. 











Objective





- Vital Signs/Intake and Output


Vital Signs (last 24 hours): 


                                        











Temp Pulse Resp BP Pulse Ox


 


 98.7 F   83   20   115/75   97 


 


 11/18/18 08:10  11/18/18 08:10  11/18/18 08:10  11/18/18 08:10  11/18/18 08:10








Intake and Output: 


                                        











 11/18/18 11/18/18





 06:59 18:59


 


Intake Total 930 500


 


Output Total 650 


 


Balance 280 500














- Medications


Medications: 


                               Current Medications





Acetaminophen (Tylenol 325mg Tab)  650 mg PO Q6 PRN


   PRN Reason: Fever >100.4 F


   Last Admin: 11/16/18 17:47 Dose:  650 mg


Amlodipine Besylate (Norvasc)  5 mg PO DAILY [facility]


   Last Admin: 11/18/18 09:40 Dose:  5 mg


Ascorbic Acid (Vitamin C 500 Mg Tab)  500 mg PO DAILY [facility]


   Last Admin: 11/18/18 09:41 Dose:  500 mg


Enoxaparin Sodium (Lovenox)  40 mg SC DAILY [facility]


   Last Admin: 11/18/18 09:41 Dose:  Not Given


Fluticasone Propionate (Flonase)  0 spr DAVE BID [facility]


   Last Admin: 11/18/18 09:42 Dose:  1 spr


Gabapentin (Neurontin)  300 mg PO HS [facility]


   Last Admin: 11/17/18 21:26 Dose:  300 mg


Vancomycin HCl 1 gm/ Sodium (Chloride)  250 mls @ 166.7 mls/hr IVPB Q24H [facility]; 

Protocol


   Last Admin: 11/17/18 17:31 Dose:  166.7 mls/hr


Piperacillin Sod/Tazobactam Sod (Zosyn 3.375 Gm Iv Premix)  3.375 gm in 50 mls @

100 mls/hr IVPB Q8H [facility]; Protocol


   Last Admin: 11/18/18 10:04 Dose:  100 mls/hr


Multivitamins (Hexavitamin)  1 tab PO DAILY [facility]


   Last Admin: 11/18/18 09:40 Dose:  1 tab











- Labs


Labs: 


                                        





                                 11/18/18 08:21 





                                 11/18/18 08:21 





                                        











PT  11.2 SECONDS (9.7-12.2)   11/16/18  11:24    


 


INR  1.0   11/16/18  11:24    


 


APTT  29 SECONDS (21-34)   11/16/18  11:24    








- Constitutional


Appears: Non-toxic, No Acute Distress





- Head Exam


Head Exam: ATRAUMATIC, NORMOCEPHALIC





- Eye Exam


Eye Exam: EOMI





- ENT Exam


ENT Exam: Mucous Membranes Moist





- Respiratory Exam


Respiratory Exam: NORMAL BREATHING PATTERN





- Cardiovascular Exam


Cardiovascular Exam: +S1, +S2





- GI/Abdominal Exam


GI & Abdominal Exam: Soft.  absent: Tenderness





- Extremities Exam


Additional comments: 





LLE DP and PT palpable 2+ 





- Neurological Exam


Neurological Exam: Alert, Awake





- Psychiatric Exam


Psychiatric exam: Normal Affect, Normal Mood





- Skin


Skin Exam: Dry, Intact





Assessment and Plan





- Assessment and Plan (Free Text)


Assessment: 


58M with Right hallux ulcer


Patient PVR show good wave forms and HARIS.





No surgical management indicated at this time.


continue management per podiatry.


Further recs per Dr. Jesu Cisneros PGY3

## 2018-11-19 LAB
ALBUMIN SERPL-MCNC: 4.3 G/DL (ref 3.5–5)
ALBUMIN/GLOB SERPL: 1.3 {RATIO} (ref 1–2.1)
ALT SERPL-CCNC: 26 U/L (ref 21–72)
AST SERPL-CCNC: 67 U/L (ref 17–59)
BASOPHILS # BLD AUTO: 0.1 K/UL (ref 0–0.2)
BASOPHILS NFR BLD: 1.3 % (ref 0–2)
BUN SERPL-MCNC: 14 MG/DL (ref 9–20)
CALCIUM SERPL-MCNC: 9.2 MG/DL (ref 8.6–10.4)
EOSINOPHIL # BLD AUTO: 0.3 K/UL (ref 0–0.7)
EOSINOPHIL NFR BLD: 4.6 % (ref 0–4)
ERYTHROCYTE [DISTWIDTH] IN BLOOD BY AUTOMATED COUNT: 14.7 % (ref 11.5–14.5)
GFR NON-AFRICAN AMERICAN: > 60
HGB BLD-MCNC: 14.1 G/DL (ref 12–18)
LYMPHOCYTES # BLD AUTO: 1.6 K/UL (ref 1–4.3)
LYMPHOCYTES NFR BLD AUTO: 21.9 % (ref 20–40)
MCH RBC QN AUTO: 33.7 PG (ref 27–31)
MCHC RBC AUTO-ENTMCNC: 34.4 G/DL (ref 33–37)
MCV RBC AUTO: 97.9 FL (ref 80–94)
MONOCYTES # BLD: 1 K/UL (ref 0–0.8)
MONOCYTES NFR BLD: 14.1 % (ref 0–10)
NEUTROPHILS # BLD: 4.2 K/UL (ref 1.8–7)
NEUTROPHILS NFR BLD AUTO: 58.1 % (ref 50–75)
NRBC BLD AUTO-RTO: 0.1 % (ref 0–2)
PLATELET # BLD: 165 K/UL (ref 130–400)
PMV BLD AUTO: 9.5 FL (ref 7.2–11.7)
RBC # BLD AUTO: 4.19 MIL/UL (ref 4.4–5.9)
WBC # BLD AUTO: 7.3 K/UL (ref 4.8–10.8)

## 2018-11-19 RX ADMIN — TAZOBACTAM SODIUM AND PIPERACILLIN SODIUM SCH MLS/HR: 375; 3 INJECTION, SOLUTION INTRAVENOUS at 10:03

## 2018-11-19 RX ADMIN — ENOXAPARIN SODIUM SCH MG: 40 INJECTION SUBCUTANEOUS at 10:02

## 2018-11-19 RX ADMIN — FLUTICASONE PROPIONATE SCH SPR: 50 SPRAY, METERED NASAL at 10:03

## 2018-11-19 RX ADMIN — FLUTICASONE PROPIONATE SCH: 50 SPRAY, METERED NASAL at 20:11

## 2018-11-19 RX ADMIN — TAZOBACTAM SODIUM AND PIPERACILLIN SODIUM SCH MLS/HR: 375; 3 INJECTION, SOLUTION INTRAVENOUS at 03:03

## 2018-11-19 RX ADMIN — Medication SCH TAB: at 10:02

## 2018-11-19 RX ADMIN — TAZOBACTAM SODIUM AND PIPERACILLIN SODIUM SCH MLS/HR: 375; 3 INJECTION, SOLUTION INTRAVENOUS at 20:07

## 2018-11-19 NOTE — CP.PCM.PN
Subjective





- Date & Time of Evaluation


Date of Evaluation: 11/19/18


Time of Evaluation: 09:00





- Subjective


Subjective: 





MRI not done


refusing PICC line


needs ceretec scan





Objective





- Vital Signs/Intake and Output


Vital Signs (last 24 hours): 


                                        











Temp Pulse Resp BP Pulse Ox


 


 97.9 F   72   20   112/75   96 


 


 11/19/18 08:18  11/19/18 08:18  11/19/18 08:18  11/19/18 08:18  11/19/18 08:18








Intake and Output: 


                                        











 11/19/18 11/19/18





 06:59 18:59


 


Intake Total 1200 


 


Output Total 850 


 


Balance 350 














- Medications


Medications: 


                               Current Medications





Acetaminophen (Tylenol 325mg Tab)  650 mg PO Q6 PRN


   PRN Reason: Fever >100.4 F


   Last Admin: 11/16/18 17:47 Dose:  650 mg


Amlodipine Besylate (Norvasc)  5 mg PO DAILY [facility]


   Last Admin: 11/19/18 10:02 Dose:  5 mg


Ascorbic Acid (Vitamin C 500 Mg Tab)  500 mg PO DAILY [facility]


   Last Admin: 11/19/18 10:02 Dose:  500 mg


Enoxaparin Sodium (Lovenox)  40 mg SC DAILY [facility]


   Last Admin: 11/19/18 10:02 Dose:  40 mg


Fluticasone Propionate (Flonase)  0 spr DAVE BID [facility]


   Last Admin: 11/19/18 10:03 Dose:  1 spr


Gabapentin (Neurontin)  300 mg PO HS [facility]


   Last Admin: 11/18/18 21:44 Dose:  300 mg


Piperacillin Sod/Tazobactam Sod (Zosyn 3.375 Gm Iv Premix)  3.375 gm in 50 mls @

100 mls/hr IVPB Q8H [facility]; Protocol


   Last Admin: 11/19/18 10:03 Dose:  100 mls/hr


Vancomycin HCl 1 gm/ Sodium (Chloride)  250 mls @ 166.7 mls/hr IVPB Q12H [facility]; 

Protocol


   Last Admin: 11/19/18 05:55 Dose:  166.7 mls/hr


Multivitamins (Hexavitamin)  1 tab PO DAILY [facility]


   Last Admin: 11/19/18 10:02 Dose:  1 tab











- Labs


Labs: 


                                        





                                 11/19/18 08:41 





                                 11/19/18 08:41 





                                        











PT  11.2 SECONDS (9.7-12.2)   11/16/18  11:24    


 


INR  1.0   11/16/18  11:24    


 


APTT  29 SECONDS (21-34)   11/16/18  11:24    














- Constitutional


Appears: Non-toxic, Chronically Ill





- Head Exam


Head Exam: NORMOCEPHALIC





- Eye Exam


Eye Exam: absent: Scleral icterus





- ENT Exam


ENT Exam: Mucous Membranes Dry





- Neck Exam


Neck Exam: absent: Lymphadenopathy





- Respiratory Exam


Respiratory Exam: Decreased Breath Sounds





- Cardiovascular Exam


Cardiovascular Exam: REGULAR RHYTHM





- GI/Abdominal Exam


GI & Abdominal Exam: Distended





Assessment and Plan


(1) Osteomyelitis


Status: Acute   





(2) Toe ulcer


Status: Acute   





(3) Alcohol abuse


Status: Acute   





- Assessment and Plan (Free Text)


Assessment: 





cont iv rx

## 2018-11-19 NOTE — CP.PCM.PN
Subjective





- Date & Time of Evaluation


Date of Evaluation: 11/19/18


Time of Evaluation: 07:15





- Subjective


Subjective: 


clinically same





Objective





- Vital Signs/Intake and Output


Vital Signs (last 24 hours): 


                                        











Temp Pulse Resp BP Pulse Ox


 


 97.9 F   66   20   120/78   96 


 


 11/19/18 16:11  11/19/18 16:11  11/19/18 16:11  11/19/18 16:11  11/19/18 16:11








Intake and Output: 


                                        











 11/19/18 11/19/18





 06:59 18:59


 


Intake Total 1200 


 


Output Total 850 


 


Balance 350 














- Medications


Medications: 


                               Current Medications





Acetaminophen (Tylenol 325mg Tab)  650 mg PO Q6 PRN


   PRN Reason: Fever >100.4 F


   Last Admin: 11/16/18 17:47 Dose:  650 mg


Amlodipine Besylate (Norvasc)  5 mg PO DAILY [facility]


   Last Admin: 11/19/18 10:02 Dose:  5 mg


Ascorbic Acid (Vitamin C 500 Mg Tab)  500 mg PO DAILY [facility]


   Last Admin: 11/19/18 10:02 Dose:  500 mg


Enoxaparin Sodium (Lovenox)  40 mg SC DAILY [facility]


   Last Admin: 11/19/18 10:02 Dose:  40 mg


Fluticasone Propionate (Flonase)  0 spr DAVE BID [facility]


   Last Admin: 11/19/18 10:03 Dose:  1 spr


Gabapentin (Neurontin)  300 mg PO HS [facility]


   Last Admin: 11/18/18 21:44 Dose:  300 mg


Piperacillin Sod/Tazobactam Sod (Zosyn 3.375 Gm Iv Premix)  3.375 gm in 50 mls @

100 mls/hr IVPB Q8H [facility]; Protocol


   Last Admin: 11/19/18 10:03 Dose:  100 mls/hr


Vancomycin HCl 1 gm/ Sodium (Chloride)  250 mls @ 166.7 mls/hr IVPB Q12H [facility]; 

Protocol


   Last Admin: 11/19/18 18:02 Dose:  166.7 mls/hr


Multivitamins (Hexavitamin)  1 tab PO DAILY [facility]


   Last Admin: 11/19/18 10:02 Dose:  1 tab











- Labs


Labs: 


                                        





                                 11/19/18 08:41 





                                 11/19/18 08:41 





                                        











PT  11.2 SECONDS (9.7-12.2)   11/16/18  11:24    


 


INR  1.0   11/16/18  11:24    


 


APTT  29 SECONDS (21-34)   11/16/18  11:24    














Assessment and Plan


(1) Osteomyelitis


Status: Acute   





(2) Toe ulcer


Status: Acute   





(3) Alcohol abuse


Status: Acute   





(4) Alcohol intoxication


Status: Acute   





(5) Hypothermia


Status: Acute   





(6) Thrombocytopenia


Status: Acute   





(7) Visit for wound check


Status: Acute

## 2018-11-19 NOTE — CP.PCM.PN
Subjective





- Date & Time of Evaluation


Date of Evaluation: 11/19/18


Time of Evaluation: 11:20





- Subjective


Subjective: 





Podiatry Progress Note for Dr. Figueroa





58M seen and evaluated at bedside for left hallux ulceration. Patient is AAO x 3

and NAD, resting comfortably in bed. Denies any acute overnight events or new 

pedal complaints. States that pain is well controlled. Denies any recent 

N/V/F/C/CP/SOB/D





Objective





- Vital Signs/Intake and Output


Vital Signs (last 24 hours): 


                                        











Temp Pulse Resp BP Pulse Ox


 


 97.9 F   72   20   112/75   96 


 


 11/19/18 08:18  11/19/18 08:18  11/19/18 08:18  11/19/18 08:18  11/19/18 08:18








Intake and Output: 


                                        











 11/19/18 11/19/18





 06:59 18:59


 


Intake Total 1200 


 


Output Total 850 


 


Balance 350 














- Medications


Medications: 


                               Current Medications





Acetaminophen (Tylenol 325mg Tab)  650 mg PO Q6 PRN


   PRN Reason: Fever >100.4 F


   Last Admin: 11/16/18 17:47 Dose:  650 mg


Amlodipine Besylate (Norvasc)  5 mg PO DAILY [facility]


   Last Admin: 11/19/18 10:02 Dose:  5 mg


Ascorbic Acid (Vitamin C 500 Mg Tab)  500 mg PO DAILY [facility]


   Last Admin: 11/19/18 10:02 Dose:  500 mg


Enoxaparin Sodium (Lovenox)  40 mg SC DAILY [facility]


   Last Admin: 11/19/18 10:02 Dose:  40 mg


Fluticasone Propionate (Flonase)  0 spr DAVE BID [facility]


   Last Admin: 11/19/18 10:03 Dose:  1 spr


Gabapentin (Neurontin)  300 mg PO HS [facility]


   Last Admin: 11/18/18 21:44 Dose:  300 mg


Piperacillin Sod/Tazobactam Sod (Zosyn 3.375 Gm Iv Premix)  3.375 gm in 50 mls @

100 mls/hr IVPB Q8H [facility]; Protocol


   Last Admin: 11/19/18 10:03 Dose:  100 mls/hr


Vancomycin HCl 1 gm/ Sodium (Chloride)  250 mls @ 166.7 mls/hr IVPB Q12H [facility]; 

Protocol


   Last Admin: 11/19/18 05:55 Dose:  166.7 mls/hr


Multivitamins (Hexavitamin)  1 tab PO DAILY [facility]


   Last Admin: 11/19/18 10:02 Dose:  1 tab











- Labs


Labs: 


                                        





                                 11/19/18 08:41 





                                 11/19/18 08:41 





                                        











PT  11.2 SECONDS (9.7-12.2)   11/16/18  11:24    


 


INR  1.0   11/16/18  11:24    


 


APTT  29 SECONDS (21-34)   11/16/18  11:24    














- Constitutional


Appears: Well, Non-toxic, No Acute Distress





- Extremities Exam


Additional comments: 





LLE focused exam:





Vasc: DP/PT pulses palpable 2/4. Skin temperature warm to warm from proximal to 

distal WNL. CFT < 3 seconds to all digits. Minimal edema noted to hallux


Neuro: Epicritic and protective sensation grossly diminished


Derm: Superficial ulceration with partial overlying callous formation noted to 

plantar aspect of left hallux. Minimal sanguinous drainage appreciated. No boby

robinson, probe to bone, tracking, tunneling or undermining. No other clinical signs 

of infection appreciated


MSK: Minimal POP to plantar aspect of hallux. Previous partial amputation of 

left second and third digits appreciated





- Neurological Exam


Neurological Exam: Alert, Awake, Oriented x3





- Psychiatric Exam


Psychiatric exam: Normal Affect, Normal Mood





Assessment and Plan





- Assessment and Plan (Free Text)


Assessment: 





58M seen and evaluated at bedside for left hallux ulceration


Plan: 





Patient seen and evaluated


Plan discussed with Dr. Figueroa


Afebrile, absent leukocytosis


Continue abx per ID


Wound cx toe: Morg Pacoanii Ss Morganii


Foot xray: On the lateral view of the great toe plantar cortical irregularities 

concerning for contiguous osteomyelitis and contiguous cellulitis is suspect.  

Correlate clinically.  The frontal view shows prominent exostosis of the medial 

great toe distal phalanx


ESR: 9


LE US: Read pending


Callous formation sharply debrided using #10 blade without incident. Underlying 

tissue noted to be 100% granular with healthy bleeding appreciated


Wound dressed with hydrogen peroxide soaked gauze, DSD


No plan for surgical intervention at this time


Per Dr. Figueroa patient should be treated with abx in rehab facility


Podiatry will continue to follow while patient in house

## 2018-11-19 NOTE — VASCLAB
Date of service: 



11/16/2018



STUDY DESCRIPTION:  Lower Extremity Arterial Exam (PVR).



HISTORY:

 eval for PVD 



PRIORS:

None. 



TECHNIQUE:

Pulse volume recording waveforms and segmental pressures of bilateral 

lower extremities at multiple levels were obtained. Ankle Brachial 

Indices (ABIs) were calculated.



Report prepared by  TIAGO Calvillo, RVT



RIGHT LOWER EXTREMITY:  

* Brachial artery: Pressure - 154 mmHg. 



* High thigh: Pressure - 182 mmHg: Ratio - 1.18: PVR waveform -  

Pulsatile



* Low thigh: Pressure - 185 mmHg: Ratio -  1.20 PVR waveform:  

Pulsatile



* Calf: Pressure - 174 mmHg: Ratio - 1.13 PVR waveform:  Pulsatile



* Posterior tibial Artery: Pressure - 182 mmHg: Ratio - 1.18 PVR 

waveform:  Pulsatile  



* Dorsalis pedis Artery: Pressure - 191 mmHg: Ratio - 1.24 PVR 

waveform:  Pulsatile



* Great toe: Pressure -  mmHg: Ratio -  PVR waveform:  



Ankle brachial index (HARIS): 1.24



LEFT LOWER EXTREMITY:  

* Brachial artery: Pressure - 153 mmHg. 



* High thigh: Pressure - 175 mmHg: Ratio - 1.14: PVR waveform -  

Pulsatile



* Low thigh: Pressure - 184 mmHg: Ratio - 1.19 PVR waveform:  

Pulsatile



* Calf: Pressure - 190 mmHg: Ratio - 1.23 PVR waveform:  Pulsatile 



* Posterior tibial Artery: Pressure - 164 mmHg: Ratio - 1.06 PVR 

waveform:  Pulsatile



* Dorsalis pedis Artery: Pressure - 187 mmHg: Ratio - 1.21 PVR 

waveform:   Pulsatile



* Great toe: Pressure -  mmHg: Ratio -  PVR waveform:   



Ankle brachial index (HARIS): 1.21



OTHER FINDINGS:  Right: 



Left:  



IMPRESSION:

Right: There was no evidence of hemodynamically significant arterial 

insufficiency in the right lower extremity.



Left: There was no evidence of hemodynamically significant arterial 

insufficiency in the left lower extremity.

## 2018-11-20 LAB
ALBUMIN SERPL-MCNC: 4.6 G/DL (ref 3.5–5)
ALBUMIN/GLOB SERPL: 1.2 {RATIO} (ref 1–2.1)
ALT SERPL-CCNC: 37 U/L (ref 21–72)
AST SERPL-CCNC: 62 U/L (ref 17–59)
BASOPHILS # BLD AUTO: 0.1 K/UL (ref 0–0.2)
BASOPHILS NFR BLD: 0.9 % (ref 0–2)
BUN SERPL-MCNC: 17 MG/DL (ref 9–20)
CALCIUM SERPL-MCNC: 9.5 MG/DL (ref 8.6–10.4)
EOSINOPHIL # BLD AUTO: 0.3 K/UL (ref 0–0.7)
EOSINOPHIL NFR BLD: 4.1 % (ref 0–4)
ERYTHROCYTE [DISTWIDTH] IN BLOOD BY AUTOMATED COUNT: 14.7 % (ref 11.5–14.5)
GFR NON-AFRICAN AMERICAN: > 60
HGB BLD-MCNC: 14.8 G/DL (ref 12–18)
LYMPHOCYTES # BLD AUTO: 1.9 K/UL (ref 1–4.3)
LYMPHOCYTES NFR BLD AUTO: 24.6 % (ref 20–40)
MCH RBC QN AUTO: 33.4 PG (ref 27–31)
MCHC RBC AUTO-ENTMCNC: 34 G/DL (ref 33–37)
MCV RBC AUTO: 98.4 FL (ref 80–94)
MONOCYTES # BLD: 1.2 K/UL (ref 0–0.8)
MONOCYTES NFR BLD: 16.1 % (ref 0–10)
NEUTROPHILS # BLD: 4.1 K/UL (ref 1.8–7)
NEUTROPHILS NFR BLD AUTO: 54.3 % (ref 50–75)
NRBC BLD AUTO-RTO: 0 % (ref 0–2)
PLATELET # BLD: 204 K/UL (ref 130–400)
PMV BLD AUTO: 9.6 FL (ref 7.2–11.7)
RBC # BLD AUTO: 4.44 MIL/UL (ref 4.4–5.9)
WBC # BLD AUTO: 7.6 K/UL (ref 4.8–10.8)

## 2018-11-20 RX ADMIN — Medication SCH TAB: at 09:48

## 2018-11-20 RX ADMIN — FLUTICASONE PROPIONATE SCH: 50 SPRAY, METERED NASAL at 09:50

## 2018-11-20 RX ADMIN — TAZOBACTAM SODIUM AND PIPERACILLIN SODIUM SCH MLS/HR: 375; 3 INJECTION, SOLUTION INTRAVENOUS at 10:46

## 2018-11-20 RX ADMIN — FLUTICASONE PROPIONATE SCH: 50 SPRAY, METERED NASAL at 17:44

## 2018-11-20 RX ADMIN — ENOXAPARIN SODIUM SCH: 40 INJECTION SUBCUTANEOUS at 09:48

## 2018-11-20 RX ADMIN — TAZOBACTAM SODIUM AND PIPERACILLIN SODIUM SCH MLS/HR: 375; 3 INJECTION, SOLUTION INTRAVENOUS at 18:30

## 2018-11-20 RX ADMIN — TAZOBACTAM SODIUM AND PIPERACILLIN SODIUM SCH MLS/HR: 375; 3 INJECTION, SOLUTION INTRAVENOUS at 02:57

## 2018-11-20 NOTE — CP.PCM.PN
Objective





- Vital Signs/Intake and Output


Vital Signs (last 24 hours): 


                                        











Temp Pulse Resp BP Pulse Ox


 


 97.7 F   68   20   126/79   97 


 


 11/20/18 07:44  11/20/18 07:44  11/20/18 07:44  11/20/18 07:44  11/20/18 07:44








Intake and Output: 


                                        











 11/20/18 11/20/18





 06:59 18:59


 


Intake Total 1330 


 


Output Total 850 


 


Balance 480 














- Medications


Medications: 


                               Current Medications





Acetaminophen (Tylenol 325mg Tab)  650 mg PO Q6 PRN


   PRN Reason: Fever >100.4 F


   Last Admin: 11/16/18 17:47 Dose:  650 mg


Amlodipine Besylate (Norvasc)  5 mg PO DAILY [facility]


   Last Admin: 11/20/18 09:48 Dose:  5 mg


Ascorbic Acid (Vitamin C 500 Mg Tab)  500 mg PO DAILY [facility]


   Last Admin: 11/20/18 09:48 Dose:  500 mg


Enoxaparin Sodium (Lovenox)  40 mg SC DAILY [facility]


   Last Admin: 11/20/18 09:48 Dose:  Not Given


Fluticasone Propionate (Flonase)  0 spr DAVE BID [facility]


   Last Admin: 11/20/18 09:50 Dose:  Not Given


Gabapentin (Neurontin)  300 mg PO HS [facility]


   Last Admin: 11/19/18 21:30 Dose:  300 mg


Piperacillin Sod/Tazobactam Sod (Zosyn 3.375 Gm Iv Premix)  3.375 gm in 50 mls @

100 mls/hr IVPB Q8H [facility]; Protocol


   Last Admin: 11/20/18 10:46 Dose:  100 mls/hr


Vancomycin HCl 1 gm/ Sodium (Chloride)  250 mls @ 166.7 mls/hr IVPB Q12H [facility]; 

Protocol


   Last Admin: 11/20/18 05:01 Dose:  166.7 mls/hr


Multivitamins (Hexavitamin)  1 tab PO DAILY [facility]


   Last Admin: 11/20/18 09:48 Dose:  1 tab











- Labs


Labs: 


                                        





                                 11/20/18 08:35 





                                 11/20/18 08:35 





                                        











PT  11.2 SECONDS (9.7-12.2)   11/16/18  11:24    


 


INR  1.0   11/16/18  11:24    


 


APTT  29 SECONDS (21-34)   11/16/18  11:24    














Assessment and Plan





- Assessment and Plan (Free Text)


Assessment: 





58 year old male admitted with left toe ulcer, seen and examined. Alert and 

orientedx3, denies acute pain or distress. MRI positive for OM, advised for 6 

weeks iv antibiotics but he is very afraid of PICC line placement. Will send to 

[place] with heplock in place as per DR ASHLEY Post. He has good veins. To be 

followed up by DR Basilio.

## 2018-11-20 NOTE — CP.PCM.PN
Subjective





- Date & Time of Evaluation


Date of Evaluation: 11/20/18


Time of Evaluation: 08:00





- Subjective


Subjective: 





MRI pending





Objective





- Vital Signs/Intake and Output


Vital Signs (last 24 hours): 


                                        











Temp Pulse Resp BP Pulse Ox


 


 97.7 F   68   20   126/79   97 


 


 11/20/18 07:44  11/20/18 07:44  11/20/18 07:44  11/20/18 07:44  11/20/18 07:44








Intake and Output: 


                                        











 11/20/18 11/20/18





 06:59 18:59


 


Intake Total 1330 


 


Output Total 850 


 


Balance 480 














- Medications


Medications: 


                               Current Medications





Acetaminophen (Tylenol 325mg Tab)  650 mg PO Q6 PRN


   PRN Reason: Fever >100.4 F


   Last Admin: 11/16/18 17:47 Dose:  650 mg


Amlodipine Besylate (Norvasc)  5 mg PO DAILY [facility]


   Last Admin: 11/20/18 09:48 Dose:  5 mg


Ascorbic Acid (Vitamin C 500 Mg Tab)  500 mg PO DAILY [facility]


   Last Admin: 11/20/18 09:48 Dose:  500 mg


Enoxaparin Sodium (Lovenox)  40 mg SC DAILY [facility]


   Last Admin: 11/20/18 09:48 Dose:  Not Given


Fluticasone Propionate (Flonase)  0 spr DAVE BID [facility]


   Last Admin: 11/20/18 09:50 Dose:  Not Given


Gabapentin (Neurontin)  300 mg PO HS [facility]


   Last Admin: 11/19/18 21:30 Dose:  300 mg


Piperacillin Sod/Tazobactam Sod (Zosyn 3.375 Gm Iv Premix)  3.375 gm in 50 mls @

100 mls/hr IVPB Q8H [facility]; Protocol


   Last Admin: 11/20/18 10:46 Dose:  100 mls/hr


Vancomycin HCl 1 gm/ Sodium (Chloride)  250 mls @ 166.7 mls/hr IVPB Q12H [facility]; 

Protocol


   Last Admin: 11/20/18 05:01 Dose:  166.7 mls/hr


Multivitamins (Hexavitamin)  1 tab PO DAILY [facility]


   Last Admin: 11/20/18 09:48 Dose:  1 tab











- Labs


Labs: 


                                        





                                 11/20/18 08:35 





                                 11/20/18 08:35 





                                        











PT  11.2 SECONDS (9.7-12.2)   11/16/18  11:24    


 


INR  1.0   11/16/18  11:24    


 


APTT  29 SECONDS (21-34)   11/16/18  11:24    














- Constitutional


Appears: Non-toxic, Chronically Ill





- Head Exam


Head Exam: NORMOCEPHALIC





- Eye Exam


Eye Exam: absent: Scleral icterus





- ENT Exam


ENT Exam: Mucous Membranes Dry





- Neck Exam


Neck Exam: absent: Lymphadenopathy





- Respiratory Exam


Respiratory Exam: Decreased Breath Sounds





- Cardiovascular Exam


Cardiovascular Exam: REGULAR RHYTHM





- GI/Abdominal Exam


GI & Abdominal Exam: Distended, Soft





- Rectal Exam


Rectal Exam: Deferred





-  Exam


 Exam: NORMAL INSPECTION





- Extremities Exam


Extremities Exam: Pedal Edema





- Back Exam


Back Exam: absent: CVA tenderness (L), CVA tenderness (R)





- Neurological Exam


Neurological Exam: Alert, Awake, CN II-XII Intact





Assessment and Plan


(1) Osteomyelitis


Status: Acute   





(2) Toe ulcer


Status: Acute   





(3) Alcohol abuse


Status: Acute   





- Assessment and Plan (Free Text)


Assessment: 





cont iv rx  possible long term rx

## 2018-11-20 NOTE — MRI
MRI left forefoot 



HISTORY:

Ulcer.  Evaluate for osteomyelitis. 



Comparison: X-ray dated 11/15/2018 



Technique: Multi-echo multiplanar sequences were performed through 

the left forefoot without and with the use of intravenous contrast. 



Findings: 



Ulceration with soft tissue defect noted at the volar aspect of the 

1st distal phalanx.   Adjacent signal abnormality noted within the 

1st distal phalanx with decreased T1 signal and increased STIR signal 

consistent with an acute osteomyelitis. Fluid noted at the 1st 

interphalangeal joint space. 



Curvilinear serpiginous signal abnormality seen within the proximal 

and midportion of the 1st proximal phalanx demonstrating serpiginous 

areas of increased STIR signal with central areas of decreased T1 

signal concerning for a bone infarct / focal osteonecrosis versus 

additional etiology.  Superimposed acute infectious and or 

inflammatory changes at this level cannot be excluded.  Clinical 

correlation. 



Moderate hallux valgus deformity. 



Resection of the 2nd and 3rd digits to the level of the heads of the 

proximal phalanges. 



Incidentally noted is signal abnormality within the head of the 4th 

middle phalanx demonstrating decreased T1 signal and increased STIR 

signal which may represent prominent osteochondral change; however, 

acute infectious and or inflammatory changes at this level cannot be 

excluded.  Clinical correlation. 



Impression:



1. Ulceration with soft tissue defect noted at the volar aspect of 

the 1st distal phalanx.   Adjacent signal abnormality noted within 

the 1st distal phalanx with decreased T1 signal and increased STIR 

signal consistent with an acute osteomyelitis. Fluid noted at the 1st 

interphalangeal joint space. 



2. Curvilinear serpiginous signal abnormality seen within the 

proximal and midportion of the 1st proximal phalanx demonstrating 

serpiginous areas of increased STIR signal with central areas of 

decreased T1 signal concerning for a bone infarct / focal 

osteonecrosis versus additional etiology.  Superimposed acute 

infectious and or inflammatory changes at this level cannot be 

excluded.  Clinical correlation. 



3. Moderate hallux valgus deformity. 



4. Resection of the 2nd and 3rd digits to the level of the heads of 

the proximal phalanges. 



5. Incidentally noted is signal abnormality within the head of the 

4th middle phalanx demonstrating decreased T1 signal and increased 

STIR signal which may represent prominent osteochondral change; 

however, acute infectious and or inflammatory changes at this level 

cannot be excluded.  Clinical correlation.

## 2018-11-20 NOTE — CP.PCM.PN
Subjective





- Date & Time of Evaluation


Date of Evaluation: 11/20/18


Time of Evaluation: 07:15





- Subjective


Subjective: 


clinically same





Objective





- Vital Signs/Intake and Output


Vital Signs (last 24 hours): 


                                        











Temp Pulse Resp BP Pulse Ox


 


 97.7 F   68   20   126/79   97 


 


 11/20/18 07:44  11/20/18 07:44  11/20/18 07:44  11/20/18 07:44  11/20/18 07:44








Intake and Output: 


                                        











 11/20/18 11/20/18





 06:59 18:59


 


Intake Total 1330 


 


Output Total 850 


 


Balance 480 














- Medications


Medications: 


                               Current Medications





Acetaminophen (Tylenol 325mg Tab)  650 mg PO Q6 PRN


   PRN Reason: Fever >100.4 F


   Last Admin: 11/16/18 17:47 Dose:  650 mg


Amlodipine Besylate (Norvasc)  [facility]ILY [facility]


   Last Admin: 11/20/18 09:48 Dose:  5 mg


Ascorbic Acid (Vitamin C 500 Mg Tab)  500 mg PO [facility]ILY [facility]


   Last Admin: 11/20/18 09:48 Dose:  500 mg


Enoxaparin Sodium (Lovenox)  40 mg SC [facility]ILY [facility]


   Last Admin: 11/20/18 09:48 Dose:  Not Given


Fluticasone Propionate (Flonase)  0 spr DAVE BID [facility]


   Last Admin: 11/20/18 09:50 Dose:  Not Given


Gabapentin (Neurontin)  300 mg PO HS [facility]


   Last Admin: 11/19/18 21:30 Dose:  300 mg


Piperacillin Sod/Tazobactam Sod (Zosyn 3.375 Gm Iv Premix)  3.375 gm in 50 mls @

100 mls/hr IVPB Q8H [facility]; Protocol


   Last Admin: 11/20/18 10:46 Dose:  100 mls/hr


Vancomycin HCl 1 gm/ Sodium (Chloride)  250 mls @ 166.7 mls/hr IVPB Q12H [facility]; 

Protocol


   Last Admin: 11/20/18 05:01 Dose:  166.7 mls/hr


Multivitamins (Hexavitamin)  1 tab PO DAILY [facility]


   Last Admin: 11/20/18 09:48 Dose:  1 tab











- Labs


Labs: 


                                        





                                 11/20/18 08:35 





                                 11/20/18 08:35 





                                        











PT  11.2 SECONDS (9.7-12.2)   11/16/18  11:24    


 


INR  1.0   11/16/18  11:24    


 


APTT  29 SECONDS (21-34)   11/16/18  11:24    














- Constitutional


Appears: Well





- Head Exam


Head Exam: ATRAUMATIC, NORMAL INSPECTION, NORMOCEPHALIC





- Eye Exam


Eye Exam: EOMI, Normal appearance, PERRL


Pupil Exam: NORMAL ACCOMODATION, PERRL





- ENT Exam


ENT Exam: Mucous Membranes Moist, Normal Exam





- Neck Exam


Neck Exam: Full ROM, Normal Inspection.  absent: Lymphadenopathy





- Respiratory Exam


Respiratory Exam: Decreased Breath Sounds





- Cardiovascular Exam


Cardiovascular Exam: REGULAR RHYTHM, +S1, +S2





- GI/Abdominal Exam


GI & Abdominal Exam: Soft, Diminished Bowel Sounds





- Rectal Exam


Rectal Exam: Deferred





Assessment and Plan


(1) Osteomyelitis


Status: Acute   





(2) Toe ulcer


Status: Acute   





(3) Alcohol abuse


Status: Acute   





(4) Alcohol intoxication


Status: Acute   





(5) Hypothermia


Status: Acute   





(6) Thrombocytopenia


Status: Acute   





(7) Visit for wound check


Status: Acute

## 2018-11-21 LAB
ALBUMIN SERPL-MCNC: 4.4 G/DL (ref 3.5–5)
ALBUMIN/GLOB SERPL: 1.3 {RATIO} (ref 1–2.1)
ALT SERPL-CCNC: 40 U/L (ref 21–72)
AST SERPL-CCNC: 54 U/L (ref 17–59)
BASOPHILS # BLD AUTO: 0.1 K/UL (ref 0–0.2)
BASOPHILS NFR BLD: 1 % (ref 0–2)
BUN SERPL-MCNC: 17 MG/DL (ref 9–20)
CALCIUM SERPL-MCNC: 9.3 MG/DL (ref 8.6–10.4)
EOSINOPHIL # BLD AUTO: 0.3 K/UL (ref 0–0.7)
EOSINOPHIL NFR BLD: 4.2 % (ref 0–4)
ERYTHROCYTE [DISTWIDTH] IN BLOOD BY AUTOMATED COUNT: 14.4 % (ref 11.5–14.5)
GFR NON-AFRICAN AMERICAN: > 60
HGB BLD-MCNC: 14.1 G/DL (ref 12–18)
LYMPHOCYTES # BLD AUTO: 2 K/UL (ref 1–4.3)
LYMPHOCYTES NFR BLD AUTO: 31.2 % (ref 20–40)
MCH RBC QN AUTO: 33.2 PG (ref 27–31)
MCHC RBC AUTO-ENTMCNC: 34 G/DL (ref 33–37)
MCV RBC AUTO: 97.7 FL (ref 80–94)
MONOCYTES # BLD: 1.2 K/UL (ref 0–0.8)
MONOCYTES NFR BLD: 18.3 % (ref 0–10)
NEUTROPHILS # BLD: 2.9 K/UL (ref 1.8–7)
NEUTROPHILS NFR BLD AUTO: 45.3 % (ref 50–75)
NRBC BLD AUTO-RTO: 0.1 % (ref 0–2)
PLATELET # BLD: 214 K/UL (ref 130–400)
PMV BLD AUTO: 9.4 FL (ref 7.2–11.7)
RBC # BLD AUTO: 4.24 MIL/UL (ref 4.4–5.9)
WBC # BLD AUTO: 6.4 K/UL (ref 4.8–10.8)

## 2018-11-21 RX ADMIN — ENOXAPARIN SODIUM SCH: 40 INJECTION SUBCUTANEOUS at 09:56

## 2018-11-21 RX ADMIN — FLUTICASONE PROPIONATE SCH: 50 SPRAY, METERED NASAL at 09:56

## 2018-11-21 RX ADMIN — TAZOBACTAM SODIUM AND PIPERACILLIN SODIUM SCH MLS/HR: 375; 3 INJECTION, SOLUTION INTRAVENOUS at 11:28

## 2018-11-21 RX ADMIN — CEFEPIME SCH MLS/HR: 1 INJECTION, SOLUTION INTRAVENOUS at 16:21

## 2018-11-21 RX ADMIN — FLUTICASONE PROPIONATE SCH: 50 SPRAY, METERED NASAL at 17:10

## 2018-11-21 RX ADMIN — TAZOBACTAM SODIUM AND PIPERACILLIN SODIUM SCH MLS/HR: 375; 3 INJECTION, SOLUTION INTRAVENOUS at 02:23

## 2018-11-21 RX ADMIN — Medication SCH MG: at 17:09

## 2018-11-21 RX ADMIN — Medication SCH TAB: at 09:56

## 2018-11-21 NOTE — CP.PCM.PN
Subjective





- Date & Time of Evaluation


Date of Evaluation: 11/21/18


Time of Evaluation: 16:41





- Subjective


Subjective: 





Podiatry Progress Note for Dr. Figueroa





58M seen at bedside for left hallux ulceration. Patient is AAO x 3 and NAD, 

resting comfortably in bed. Denies any pain to foot, new pedal complaints or 

overnight events. Denies any recent N/V/F/C/CP/SOB/D





Objective





- Vital Signs/Intake and Output


Vital Signs (last 24 hours): 


                                        











Temp Pulse Resp BP Pulse Ox


 


 98.3 F   67   20   125/77   98 


 


 11/21/18 15:15  11/21/18 15:15  11/21/18 15:15  11/21/18 15:15  11/21/18 15:15








Intake and Output: 


                                        











 11/21/18 11/21/18





 06:59 18:59


 


Intake Total 750 


 


Balance 750 














- Medications


Medications: 


                               Current Medications





Acetaminophen (Tylenol 325mg Tab)  650 mg PO Q6 PRN


   PRN Reason: Fever >100.4 F


   Last Admin: 11/16/18 17:47 Dose:  650 mg


Amlodipine Besylate (Norvasc)  5 mg PO DAILY [facility]


   Last Admin: 11/21/18 09:56 Dose:  5 mg


Ascorbic Acid (Vitamin C 500 Mg Tab)  500 mg PO DAILY [facility]


   Last Admin: 11/21/18 09:56 Dose:  500 mg


Enoxaparin Sodium (Lovenox)  40 mg SC DAILY [facility]


   Last Admin: 11/21/18 09:56 Dose:  Not Given


Fluticasone Propionate (Flonase)  0 spr DAVE BID [facility]


   Last Admin: 11/21/18 09:56 Dose:  Not Given


Gabapentin (Neurontin)  300 mg PO HS [facility]


   Last Admin: 11/20/18 21:51 Dose:  300 mg


Vancomycin HCl 1 gm/ Sodium (Chloride)  250 mls @ 166.7 mls/hr IVPB Q12H [facility]; 

Protocol


   Last Admin: 11/21/18 04:22 Dose:  166.7 mls/hr


Cefepime HCl (Maxipime Iv 1 Gm Premix)  1 gm in 50 mls @ 100 mls/hr IVPB Q12H 

[facility]; Protocol


   Last Admin: 11/21/18 16:21 Dose:  100 mls/hr


Multivitamins (Hexavitamin)  1 tab PO DAILY [facility]


   Last Admin: 11/21/18 09:56 Dose:  1 tab


Saccharomyces Boulardii (Florastor)  250 mg PO BID [facility]











- Labs


Labs: 


                                        





                                 11/21/18 06:17 





                                 11/21/18 06:17 





                                        











PT  11.2 SECONDS (9.7-12.2)   11/16/18  11:24    


 


INR  1.0   11/16/18  11:24    


 


APTT  29 SECONDS (21-34)   11/16/18  11:24    














- Constitutional


Appears: Well, Non-toxic, No Acute Distress





- Extremities Exam


Additional comments: 





LLE focused exam:





Vasc: DP/PT pulses palpable 2/4. Skin temperature warm to warm from proximal to 

distal WNL. CFT < 3 seconds to all digits. Minimal edema noted to hallux


Neuro: Epicritic and protective sensation grossly diminished


Derm: Superficial ulceration with partial overlying callous formation noted to 

plantar aspect of left hallux. No drainage appreciated. No malodor, probe to 

bone, tracking, tunneling or undermining. No other clinical signs of infection 

appreciated


MSK: Minimal POP to plantar aspect of hallux. Previous partial amputation of 

left second and third digits appreciated





- Neurological Exam


Neurological Exam: Alert, Awake, Oriented x3





- Psychiatric Exam


Psychiatric exam: Normal Affect, Normal Mood





Assessment and Plan





- Assessment and Plan (Free Text)


Assessment: 





58M seen at bedside for left hallux ulceration


Plan: 





Patient seen and evaluated


Plan discussed with Dr. Figueroa


Afebrile, absent leukocytosis


Continue IV abx per ID


Wound cx toe: Bridgett Lord Ss Morganii


Foot xray: On the lateral view of the great toe plantar cortical irregularities 

concerning for contiguous osteomyelitis and contiguous cellulitis is suspect.  

Correlate clinically.  The frontal view shows prominent exostosis of the medial 

great toe distal phalanx


ESR: 9


LE arterial US: No evidence of hemodynamic insufficiency to b/l LE


LE MRI: Evidence suggestive of OM to distal phalanx of left hallux appreciated


Plan to treat with long term IV abx


Wound dressed with hydrogen peroxide soaked gauze


No plan for surgical intervention at this time


Podiatry will continue to follow while patient in house

## 2018-11-21 NOTE — CP.PCM.PN
Subjective





- Date & Time of Evaluation


Date of Evaluation: 11/21/18


Time of Evaluation: 09:30





- Subjective


Subjective: 


Medicine progress note ( Dr ZEFERINO Post)





Patient was seen and examined at bedside. Patient was resting comfortably in bed

in no acute distress. Patient denies any symptoms of fever, chills, nausea, 

vomiting, chest pain, palpitations, shortness of breath, abdominal pain, 

diarrhea/constipation and numbness/tingling in the left toe. 








Objective





- Vital Signs/Intake and Output


Vital Signs (last 24 hours): 


                                        











Temp Pulse Resp BP Pulse Ox


 


 97.3 F L  66   20   133/84   97 


 


 11/21/18 08:38  11/21/18 08:38  11/21/18 08:38  11/21/18 08:38  11/21/18 08:38








Intake and Output: 


                                        











 11/21/18 11/21/18





 06:59 18:59


 


Intake Total 750 


 


Balance 750 














- Medications


Medications: 


                               Current Medications





Acetaminophen (Tylenol 325mg Tab)  650 mg PO Q6 PRN


   PRN Reason: Fever >100.4 F


   Last Admin: 11/16/18 17:47 Dose:  650 mg


Amlodipine Besylate (Norvasc)  5 mg PO DAILY [facility]


   Last Admin: 11/21/18 09:56 Dose:  5 mg


Ascorbic Acid (Vitamin C 500 Mg Tab)  500 mg PO DAILY [facility]


   Last Admin: 11/21/18 09:56 Dose:  500 mg


Enoxaparin Sodium (Lovenox)  40 mg SC DAILY [facility]


   Last Admin: 11/21/18 09:56 Dose:  Not Given


Fluticasone Propionate (Flonase)  0 spr DAVE BID [facility]


   Last Admin: 11/21/18 09:56 Dose:  Not Given


Gabapentin (Neurontin)  300 mg PO HS [facility]


   Last Admin: 11/20/18 21:51 Dose:  300 mg


Piperacillin Sod/Tazobactam Sod (Zosyn 3.375 Gm Iv Premix)  3.375 gm in 50 mls @

100 mls/hr IVPB Q8H [facility]; Protocol


   Last Admin: 11/21/18 11:28 Dose:  100 mls/hr


Vancomycin HCl 1 gm/ Sodium (Chloride)  250 mls @ 166.7 mls/hr IVPB Q12H [facility]; 

Protocol


   Last Admin: 11/21/18 04:22 Dose:  166.7 mls/hr


Multivitamins (Hexavitamin)  1 tab PO DAILY [facility]


   Last Admin: 11/21/18 09:56 Dose:  1 tab











- Labs


Labs: 


                                        





                                 11/21/18 06:17 





                                 11/21/18 06:17 





                                        











PT  11.2 SECONDS (9.7-12.2)   11/16/18  11:24    


 


INR  1.0   11/16/18  11:24    


 


APTT  29 SECONDS (21-34)   11/16/18  11:24    














- Constitutional


Appears: Well, No Acute Distress





- Head Exam


Head Exam: ATRAUMATIC, NORMAL INSPECTION





- Eye Exam


Eye Exam: EOMI, Normal appearance





- ENT Exam


ENT Exam: Mucous Membranes Moist





- Respiratory Exam


Respiratory Exam: Clear to Ausculation Bilateral, NORMAL BREATHING PATTERN.  

absent: Accessory Muscle Use, Chest Wall Tenderness, Decreased Breath Sounds, 

Prolonged Expiratory Phase, Rhonchi, Wheezes, Respiratory Distress





- Cardiovascular Exam


Cardiovascular Exam: REGULAR RHYTHM, +S1, +S2.  absent: Tachycardia, Clicks, 

+S4, Murmur





- GI/Abdominal Exam


GI & Abdominal Exam: Soft, Normal Bowel Sounds.  absent: Distended, Firm, 

Guarding, Rigid, Tenderness, Diminished Bowel Sounds





- Extremities Exam


Extremities Exam: Full ROM, Normal Inspection.  absent: Calf Tenderness, Pedal 

Edema


Additional comments: 


DP pulses presents 


No limited ROM of left 





- Neurological Exam


Neuro motor strength exam: Left Lower Extremity: 5





- Psychiatric Exam


Psychiatric exam: Normal Affect





- Skin


Skin Exam: Normal Color





Assessment and Plan


(1) Toe osteomyelitis, left


Assessment & Plan: 


Left Hallux 


ID, Dr. Del Castillo


* Management as per recommendation


Imaging:


- Foot X-ray: On the lateral view of the great toe plantar cortical 

irregularities concerning for contiguous osteomyelitis and contiguous cellulitis

is suspect.  Correlate clinically.  The frontal view shows prominent exostosis 

of the medial great toe distal phalanx.


- Left extremity MRI: Ulceration with soft tissue defect noted at the volar 

aspect of the 1st distal phalanx.   Adjacent signal abnormality noted within the

1st distal phalanx with decreased T1 signal and increased STIR signal consistent

with an acute osteomyelitis. Fluid noted at the 1st interphalangeal joint space.

2. Curvilinear serpiginous signal abnormality seen within the proximal and 

midportion of the 1st proximal phalanx demonstrating serpiginous areas of 

increased STIR signal with central areas of decreased T1 signal concerning for a

bone infarct / focal osteonecrosis versus additional etiology.  Superimposed 

acute infectious and or inflammatory changes at this level cannot be excluded.  

Clinical correlation. 3. Moderate hallux valgus deformity. 4. Resection of the 

2nd and 3rd digits to the level of the heads of the proximal phalanges. 5. 

Incidentally noted is signal abnormality within the head of the 4th middle 

phalanx demonstrating decreased T1 signal and increased STIR signal which may 

represent prominent osteochondral change; however, acute infectious and or 

inflammatory changes at this level cannot be excluded.  Clinical correlation. 





Labs:


* Wound culture: S. Morganni 





Medications:


* Vancomycin IV 1gm Q12H (11/19/18)


* Zosyn 3.375gm IV Q8H (Started 11/15/18)----> discontinued 11/21/18


* Cefepime 1gm IV Q12H ( Started 11/21/18)


* Multivitamin 1 tab PO daily 


* Vitamin C 500mg PO daily 


* Florastor 250mg PO BID 


* Gabapentin 300mg PO HS 








Status: Acute   





(2) Hypertension


Assessment & Plan: 


Norvasc 5mg PO daily 


Status: Acute   





(3) Prophylactic measure


Assessment & Plan: 


GI: Not indicated 


DVT: Lovenox 40mg SC daily 





Disposition: Plans for discharge to [facility] on Friday. 


Patient PICC line inserted today, 11/21/18 and placement confirmed by Chest X-

ray 


Patient started on Cefepime 1gm IV Q12H 11/21/18


Patient will need Vanco 1gm IV Q12H and  Cefepime 1gm IV Q12H for a total of 6 

weeks








All plans and management discussed with Dr. June Post 








Status: Acute

## 2018-11-21 NOTE — CP.PCM.PN
Subjective





- Date & Time of Evaluation


Date of Evaluation: 11/21/18


Time of Evaluation: 08:00





- Subjective


Subjective: 





PICC in place


wound stable 





Objective





- Vital Signs/Intake and Output


Vital Signs (last 24 hours): 


                                        











Temp Pulse Resp BP Pulse Ox


 


 98.3 F   67   20   125/77   98 


 


 11/21/18 15:15  11/21/18 15:15  11/21/18 15:15  11/21/18 15:15  11/21/18 15:15











- Medications


Medications: 


                               Current Medications





Acetaminophen (Tylenol 325mg Tab)  650 mg PO Q6 PRN


   PRN Reason: Fever >100.4 F


   Last Admin: 11/16/18 17:47 Dose:  650 mg


Amlodipine Besylate (Norvasc)  5 mg PO DAILY [facility]


   Last Admin: 11/21/18 09:56 Dose:  5 mg


Ascorbic Acid (Vitamin C 500 Mg Tab)  500 mg PO DAILY [facility]


   Last Admin: 11/21/18 09:56 Dose:  500 mg


Enoxaparin Sodium (Lovenox)  40 mg SC DAILY [facility]


   Last Admin: 11/21/18 09:56 Dose:  Not Given


Fluticasone Propionate (Flonase)  0 spr DAVE BID [facility]


   Last Admin: 11/21/18 17:10 Dose:  Not Given


Gabapentin (Neurontin)  300 mg PO HS [facility]


   Last Admin: 11/20/18 21:51 Dose:  300 mg


Vancomycin HCl 1 gm/ Sodium (Chloride)  250 mls @ 166.7 mls/hr IVPB Q12H [facility]; 

Protocol


   Last Admin: 11/21/18 17:09 Dose:  166.7 mls/hr


Cefepime HCl (Maxipime Iv 1 Gm Premix)  1 gm in 50 mls @ 100 mls/hr IVPB Q12H 

[facility]; Protocol


   Last Admin: 11/21/18 16:21 Dose:  100 mls/hr


Multivitamins (Hexavitamin)  1 tab PO DAILY [facility]


   Last Admin: 11/21/18 09:56 Dose:  1 tab


Saccharomyces Boulardii (Florastor)  250 mg PO BID [facility]


   Last Admin: 11/21/18 17:09 Dose:  250 mg











- Labs


Labs: 


                                        





                                 11/21/18 06:17 





                                 11/21/18 06:17 





                                        











PT  11.2 SECONDS (9.7-12.2)   11/16/18  11:24    


 


INR  1.0   11/16/18  11:24    


 


APTT  29 SECONDS (21-34)   11/16/18  11:24    














- Constitutional


Appears: Non-toxic, Chronically Ill





- Head Exam


Head Exam: NORMOCEPHALIC





- Eye Exam


Eye Exam: absent: Scleral icterus





- ENT Exam


ENT Exam: Mucous Membranes Dry





- Neck Exam


Neck Exam: absent: Lymphadenopathy





- Respiratory Exam


Respiratory Exam: Decreased Breath Sounds





- Cardiovascular Exam


Cardiovascular Exam: REGULAR RHYTHM





- GI/Abdominal Exam


GI & Abdominal Exam: Distended, Soft





- Rectal Exam


Rectal Exam: Deferred





-  Exam


 Exam: NORMAL INSPECTION





Assessment and Plan


(1) Osteomyelitis


Status: Acute   





(2) Toe ulcer


Status: Acute   





(3) Alcohol abuse


Status: Acute   





- Assessment and Plan (Free Text)


Assessment: 





cont iv rx and woound care

## 2018-11-21 NOTE — RAD
Date of service: 



11/21/2018



HISTORY:

 verify left PICC 



COMPARISON:

Chest radiographs 11/15/2018. 



FINDINGS:



LUNGS:

Left PICC catheter inserted in the interval terminating at the distal 

superior cava.  No active pulmonary disease.



PLEURA:

No significant pleural effusion identified, no pneumothorax apparent.



CARDIOVASCULAR:

No aortic atherosclerotic calcification present.



Normal cardiac size. No pulmonary vascular congestion. 



OSSEOUS STRUCTURES:

Multilevel thoracic spondylosis/enthesiophytes.



VISUALIZED UPPER ABDOMEN:

Normal.



OTHER FINDINGS:

None.



IMPRESSION:

No acute cardiopulmonary disease.  Left PICC inserted as discussed 

above.

## 2018-11-21 NOTE — CP.PCM.PN
Subjective





- Date & Time of Evaluation


Date of Evaluation: 11/21/18


Time of Evaluation: 07:15





- Subjective


Subjective: 


clinically same





Objective





- Vital Signs/Intake and Output


Vital Signs (last 24 hours): 


                                        











Temp Pulse Resp BP Pulse Ox


 


 97.3 F L  66   20   133/84   97 


 


 11/21/18 08:38  11/21/18 08:38  11/21/18 08:38  11/21/18 08:38  11/21/18 08:38








Intake and Output: 


                                        











 11/21/18 11/21/18





 06:59 18:59


 


Intake Total 750 


 


Balance 750 














- Medications


Medications: 


                               Current Medications





Acetaminophen (Tylenol 325mg Tab)  650 mg PO Q6 PRN


   PRN Reason: Fever >100.4 F


   Last Admin: 11/16/18 17:47 Dose:  650 mg


Amlodipine Besylate (Norvasc)  5 mg PO DAILY [facility]


   Last Admin: 11/21/18 09:56 Dose:  5 mg


Ascorbic Acid (Vitamin C 500 Mg Tab)  500 mg PO DAILY [facility]


   Last Admin: 11/21/18 09:56 Dose:  500 mg


Enoxaparin Sodium (Lovenox)  40 mg SC DAILY [facility]


   Last Admin: 11/21/18 09:56 Dose:  Not Given


Fluticasone Propionate (Flonase)  0 spr DAVE BID [facility]


   Last Admin: 11/21/18 09:56 Dose:  Not Given


Gabapentin (Neurontin)  300 mg PO HS [facility]


   Last Admin: 11/20/18 21:51 Dose:  300 mg


Piperacillin Sod/Tazobactam Sod (Zosyn 3.375 Gm Iv Premix)  3.375 gm in 50 mls @

100 mls/hr IVPB Q8H [facility]; Protocol


   Last Admin: 11/21/18 11:28 Dose:  100 mls/hr


Vancomycin HCl 1 gm/ Sodium (Chloride)  250 mls @ 166.7 mls/hr IVPB Q12H [facility]; 

Protocol


   Last Admin: 11/21/18 04:22 Dose:  166.7 mls/hr


Multivitamins (Hexavitamin)  1 tab PO DAILY [facility]


   Last Admin: 11/21/18 09:56 Dose:  1 tab











- Labs


Labs: 


                                        





                                 11/21/18 06:17 





                                 11/21/18 06:17 





                                        











PT  11.2 SECONDS (9.7-12.2)   11/16/18  11:24    


 


INR  1.0   11/16/18  11:24    


 


APTT  29 SECONDS (21-34)   11/16/18  11:24    














- Constitutional


Appears: Well





- Head Exam


Head Exam: ATRAUMATIC, NORMAL INSPECTION, NORMOCEPHALIC





- Eye Exam


Eye Exam: EOMI, Normal appearance, PERRL


Pupil Exam: NORMAL ACCOMODATION, PERRL





- ENT Exam


ENT Exam: Mucous Membranes Moist, Normal Exam





- Neck Exam


Neck Exam: Full ROM, Normal Inspection.  absent: Lymphadenopathy





- Respiratory Exam


Respiratory Exam: Decreased Breath Sounds





- Cardiovascular Exam


Cardiovascular Exam: REGULAR RHYTHM, +S1, +S2





- GI/Abdominal Exam


GI & Abdominal Exam: Soft, Diminished Bowel Sounds





- Rectal Exam


Rectal Exam: Deferred





Assessment and Plan


(1) Osteomyelitis


Status: Acute   





(2) Toe ulcer


Status: Acute   





(3) Alcohol abuse


Status: Acute   





(4) Alcohol intoxication


Status: Acute   





(5) Hypothermia


Status: Acute   





(6) Thrombocytopenia


Status: Acute   





(7) Visit for wound check


Status: Acute

## 2018-11-22 LAB
ALBUMIN SERPL-MCNC: 4.6 G/DL (ref 3.5–5)
ALBUMIN/GLOB SERPL: 1.3 {RATIO} (ref 1–2.1)
ALT SERPL-CCNC: 43 U/L (ref 21–72)
AST SERPL-CCNC: 59 U/L (ref 17–59)
BASOPHILS # BLD AUTO: 0.1 K/UL (ref 0–0.2)
BASOPHILS NFR BLD: 1.1 % (ref 0–2)
BUN SERPL-MCNC: 15 MG/DL (ref 9–20)
CALCIUM SERPL-MCNC: 9.4 MG/DL (ref 8.6–10.4)
EOSINOPHIL # BLD AUTO: 0.3 K/UL (ref 0–0.7)
EOSINOPHIL NFR BLD: 3.7 % (ref 0–4)
ERYTHROCYTE [DISTWIDTH] IN BLOOD BY AUTOMATED COUNT: 14.4 % (ref 11.5–14.5)
GFR NON-AFRICAN AMERICAN: > 60
HGB BLD-MCNC: 14.3 G/DL (ref 12–18)
LYMPHOCYTES # BLD AUTO: 2.1 K/UL (ref 1–4.3)
LYMPHOCYTES NFR BLD AUTO: 29.3 % (ref 20–40)
MCH RBC QN AUTO: 33.6 PG (ref 27–31)
MCHC RBC AUTO-ENTMCNC: 34.6 G/DL (ref 33–37)
MCV RBC AUTO: 97.2 FL (ref 80–94)
MONOCYTES # BLD: 1.1 K/UL (ref 0–0.8)
MONOCYTES NFR BLD: 15.7 % (ref 0–10)
NEUTROPHILS # BLD: 3.6 K/UL (ref 1.8–7)
NEUTROPHILS NFR BLD AUTO: 50.2 % (ref 50–75)
NRBC BLD AUTO-RTO: 0.1 % (ref 0–2)
PLATELET # BLD: 202 K/UL (ref 130–400)
PMV BLD AUTO: 9.1 FL (ref 7.2–11.7)
RBC # BLD AUTO: 4.24 MIL/UL (ref 4.4–5.9)
WBC # BLD AUTO: 7.1 K/UL (ref 4.8–10.8)

## 2018-11-22 RX ADMIN — Medication SCH MG: at 17:35

## 2018-11-22 RX ADMIN — CEFEPIME SCH MLS/HR: 1 INJECTION, SOLUTION INTRAVENOUS at 17:13

## 2018-11-22 RX ADMIN — Medication SCH MG: at 09:44

## 2018-11-22 RX ADMIN — FLUTICASONE PROPIONATE SCH: 50 SPRAY, METERED NASAL at 09:46

## 2018-11-22 RX ADMIN — ENOXAPARIN SODIUM SCH: 40 INJECTION SUBCUTANEOUS at 09:46

## 2018-11-22 RX ADMIN — Medication SCH TAB: at 09:44

## 2018-11-22 RX ADMIN — CEFEPIME SCH MLS/HR: 1 INJECTION, SOLUTION INTRAVENOUS at 04:16

## 2018-11-22 RX ADMIN — FLUTICASONE PROPIONATE SCH: 50 SPRAY, METERED NASAL at 17:36

## 2018-11-22 NOTE — CP.PCM.PN
Subjective





- Date & Time of Evaluation


Date of Evaluation: 11/22/18


Time of Evaluation: 07:15





- Subjective


Subjective: 


clinically same





Objective





- Vital Signs/Intake and Output


Vital Signs (last 24 hours): 


                                        











Temp Pulse Resp BP Pulse Ox


 


 98.3 F   75   20   133/80   98 


 


 11/22/18 16:24  11/22/18 16:24  11/22/18 16:24  11/22/18 16:24  11/22/18 16:24











- Medications


Medications: 


                               Current Medications





Acetaminophen (Tylenol 325mg Tab)  650 mg PO Q6 PRN


   PRN Reason: Fever >100.4 F


   Last Admin: 11/16/18 17:47 Dose:  650 mg


Amlodipine Besylate (Norvasc)  5 mg PO DAILY [facility]


   Last Admin: 11/22/18 09:44 Dose:  5 mg


Ascorbic Acid (Vitamin C 500 Mg Tab)  500 mg PO DAILY [facility]


   Last Admin: 11/22/18 09:44 Dose:  500 mg


Fluticasone Propionate (Flonase)  0 spr DAVE BID [facility]


   Last Admin: 11/22/18 17:36 Dose:  Not Given


Gabapentin (Neurontin)  300 mg PO HS [facility]


   Last Admin: 11/21/18 21:37 Dose:  300 mg


Vancomycin HCl 1 gm/ Sodium (Chloride)  250 mls @ 166.7 mls/hr IVPB Q12H [facility]; 

Protocol


   Last Admin: 11/22/18 17:54 Dose:  166.7 mls/hr


Cefepime HCl (Maxipime Iv 1 Gm Premix)  1 gm in 50 mls @ 100 mls/hr IVPB Q12H 

[facility]; Protocol


   Last Admin: 11/22/18 17:13 Dose:  100 mls/hr


Multivitamins (Hexavitamin)  1 tab PO DAILY [facility]


   Last Admin: 11/22/18 09:44 Dose:  1 tab


Saccharomyces Boulardii (Florastor)  250 mg PO BID [facility]


   Last Admin: 11/22/18 17:35 Dose:  250 mg











- Labs


Labs: 


                                        





                                 11/22/18 04:27 





                                 11/22/18 04:27 





                                        











PT  11.2 SECONDS (9.7-12.2)   11/16/18  11:24    


 


INR  1.0   11/16/18  11:24    


 


APTT  29 SECONDS (21-34)   11/16/18  11:24    














- Constitutional


Appears: Well





- Head Exam


Head Exam: ATRAUMATIC, NORMAL INSPECTION, NORMOCEPHALIC





- Eye Exam


Eye Exam: EOMI, Normal appearance, PERRL


Pupil Exam: NORMAL ACCOMODATION, PERRL





- ENT Exam


ENT Exam: Mucous Membranes Moist, Normal Exam





- Neck Exam


Neck Exam: Full ROM, Normal Inspection.  absent: Lymphadenopathy





- Respiratory Exam


Respiratory Exam: Decreased Breath Sounds





- Cardiovascular Exam


Cardiovascular Exam: REGULAR RHYTHM, +S1, +S2





- GI/Abdominal Exam


GI & Abdominal Exam: Soft, Diminished Bowel Sounds





- Rectal Exam


Rectal Exam: Deferred





Assessment and Plan


(1) Osteomyelitis


Status: Acute   





(2) Toe ulcer


Status: Acute   





(3) Alcohol abuse


Status: Acute   





(4) Alcohol intoxication


Status: Acute   





(5) Hypothermia


Status: Acute   





(6) Thrombocytopenia


Status: Acute   





(7) Visit for wound check


Status: Acute   





- Assessment and Plan (Free Text)


Plan: 





Continue


Patient refused that.  PICC line and on the midline patient is to midline 

patient is antibiotic for 4 more weeks


Follow-up with Dr. Berman


Follow-up with the podiatrist


Follow-up with consultations


Awaiting for rehab placement


Discussed with the patient agreed


Medical problem list 9

## 2018-11-23 VITALS
SYSTOLIC BLOOD PRESSURE: 126 MMHG | OXYGEN SATURATION: 97 % | DIASTOLIC BLOOD PRESSURE: 61 MMHG | HEART RATE: 60 BPM | TEMPERATURE: 98.6 F

## 2018-11-23 VITALS — RESPIRATION RATE: 20 BRPM

## 2018-11-23 LAB
ALBUMIN SERPL-MCNC: 4.4 G/DL (ref 3.5–5)
ALBUMIN/GLOB SERPL: 1.2 {RATIO} (ref 1–2.1)
ALT SERPL-CCNC: 47 U/L (ref 21–72)
AST SERPL-CCNC: 59 U/L (ref 17–59)
BASOPHILS # BLD AUTO: 0.1 K/UL (ref 0–0.2)
BASOPHILS NFR BLD: 1 % (ref 0–2)
BUN SERPL-MCNC: 17 MG/DL (ref 9–20)
CALCIUM SERPL-MCNC: 9.3 MG/DL (ref 8.6–10.4)
EOSINOPHIL # BLD AUTO: 0.2 K/UL (ref 0–0.7)
EOSINOPHIL NFR BLD: 3.1 % (ref 0–4)
ERYTHROCYTE [DISTWIDTH] IN BLOOD BY AUTOMATED COUNT: 14.4 % (ref 11.5–14.5)
GFR NON-AFRICAN AMERICAN: > 60
HGB BLD-MCNC: 14.5 G/DL (ref 12–18)
LYMPHOCYTES # BLD AUTO: 2.1 K/UL (ref 1–4.3)
LYMPHOCYTES NFR BLD AUTO: 28.7 % (ref 20–40)
MCH RBC QN AUTO: 33.4 PG (ref 27–31)
MCHC RBC AUTO-ENTMCNC: 34.3 G/DL (ref 33–37)
MCV RBC AUTO: 97.3 FL (ref 80–94)
MONOCYTES # BLD: 1.1 K/UL (ref 0–0.8)
MONOCYTES NFR BLD: 15.5 % (ref 0–10)
NEUTROPHILS # BLD: 3.8 K/UL (ref 1.8–7)
NEUTROPHILS NFR BLD AUTO: 51.7 % (ref 50–75)
NRBC BLD AUTO-RTO: 0.1 % (ref 0–2)
PLATELET # BLD: 215 K/UL (ref 130–400)
PMV BLD AUTO: 9.3 FL (ref 7.2–11.7)
RBC # BLD AUTO: 4.33 MIL/UL (ref 4.4–5.9)
WBC # BLD AUTO: 7.3 K/UL (ref 4.8–10.8)

## 2018-11-23 RX ADMIN — Medication SCH MG: at 09:50

## 2018-11-23 RX ADMIN — FLUTICASONE PROPIONATE SCH: 50 SPRAY, METERED NASAL at 09:50

## 2018-11-23 RX ADMIN — Medication SCH TAB: at 09:50

## 2018-11-23 RX ADMIN — CEFEPIME SCH MLS/HR: 1 INJECTION, SOLUTION INTRAVENOUS at 03:50

## 2018-11-23 NOTE — CP.PCM.PN
Subjective





- Date & Time of Evaluation


Date of Evaluation: 11/23/18


Time of Evaluation: 09:15





- Subjective


Subjective: 


Medicine progress note ( Dr ZEFERINO Caceres)





Patient was seen and examined at bedside. Patient was resting comfortably in bed

in no acute distress. Patient denies any symptoms of fever, chills, nausea, 

vomiting, chest pain, palpitations, shortness of breath, abdominal pain, 

diarrhea/constipation and numbness/tingling in the left toe. 








Objective





- Vital Signs/Intake and Output


Vital Signs (last 24 hours): 


                                        











Temp Pulse Resp BP Pulse Ox


 


 97.9 F   62   20   124/77   98 


 


 11/23/18 07:00  11/23/18 07:00  11/23/18 07:00  11/23/18 07:00  11/23/18 07:00








Intake and Output: 


                                        











 11/23/18 11/23/18





 06:59 18:59


 


Intake Total 650 


 


Output Total 600 


 


Balance 50 














- Medications


Medications: 


                               Current Medications





Acetaminophen (Tylenol 325mg Tab)  650 mg PO Q6 PRN


   PRN Reason: Fever >100.4 F


   Last Admin: 11/16/18 17:47 Dose:  650 mg


Amlodipine Besylate (Norvasc)  5 mg PO DAILY [facility]


   Last Admin: 11/23/18 09:50 Dose:  5 mg


Ascorbic Acid (Vitamin C 500 Mg Tab)  500 mg PO DAILY [facility]


   Last Admin: 11/23/18 09:50 Dose:  500 mg


Fluticasone Propionate (Flonase)  0 spr DAVE BID [facility]


   Last Admin: 11/23/18 09:50 Dose:  Not Given


Gabapentin (Neurontin)  300 mg PO HS [facility]


   Last Admin: 11/22/18 21:35 Dose:  300 mg


Vancomycin HCl 1 gm/ Sodium (Chloride)  250 mls @ 166.7 mls/hr IVPB Q12H [facility]; 

Protocol


   Last Admin: 11/23/18 04:00 Dose:  166.7 mls/hr


Cefepime HCl (Maxipime Iv 1 Gm Premix)  1 gm in 50 mls @ 100 mls/hr IVPB Q12H 

[facility]; Protocol


   Last Admin: 11/23/18 03:50 Dose:  100 mls/hr


Multivitamins (Hexavitamin)  1 tab PO DAILY [facility]


   Last Admin: 11/23/18 09:50 Dose:  1 tab


Saccharomyces Boulardii (Florastor)  250 mg PO BID [facility]


   Last Admin: 11/23/18 09:50 Dose:  250 mg











- Labs


Labs: 


                                        





                                 11/23/18 06:47 





                                 11/23/18 06:47 





                                        











PT  11.2 SECONDS (9.7-12.2)   11/16/18  11:24    


 


INR  1.0   11/16/18  11:24    


 


APTT  29 SECONDS (21-34)   11/16/18  11:24    














- Constitutional


Appears: Well, No Acute Distress





- Head Exam


Head Exam: ATRAUMATIC, NORMAL INSPECTION





- Eye Exam


Eye Exam: EOMI, Normal appearance





- ENT Exam


ENT Exam: Mucous Membranes Moist





- Respiratory Exam


Respiratory Exam: Clear to Ausculation Bilateral, NORMAL BREATHING PATTERN





- Cardiovascular Exam


Cardiovascular Exam: REGULAR RHYTHM, +S1, +S2





- GI/Abdominal Exam


GI & Abdominal Exam: Soft, Normal Bowel Sounds





- Extremities Exam


Extremities Exam: absent: Calf Tenderness, Pedal Edema


Additional comments: 


Left Hallux dressing clean, dry and intact 








- Neurological Exam


Neurological Exam: Alert, Awake, Oriented x3


Neuro motor strength exam: Left Lower Extremity: 5





- Psychiatric Exam


Psychiatric exam: Normal Affect





- Skin


Skin Exam: Normal Color





Assessment and Plan


(1) Toe osteomyelitis, left


Assessment & Plan: 


Left Hallux 


ID, Dr. Del Castillo


* Management as per recommendation


Imaging:


- Foot X-ray: On the lateral view of the great toe plantar cortical 

irregularities concerning for contiguous osteomyelitis and contiguous cellulitis

is suspect.  Correlate clinically.  The frontal view shows prominent exostosis 

of the medial great toe distal phalanx.


- Left extremity MRI: Ulceration with soft tissue defect noted at the volar 

aspect of the 1st distal phalanx.   Adjacent signal abnormality noted within the

1st distal phalanx with decreased T1 signal and increased STIR signal consistent

with an acute osteomyelitis. Fluid noted at the 1st interphalangeal joint space.

2. Curvilinear serpiginous signal abnormality seen within the proximal and mid

portion of the 1st proximal phalanx demonstrating serpiginous areas of increased

STIR signal with central areas of decreased T1 signal concerning for a bone 

infarct / focal osteonecrosis versus additional etiology.  Superimposed acute 

infectious and or inflammatory changes at this level cannot be excluded.  

Clinical correlation. 3. Moderate hallux valgus deformity. 4. Resection of the 

2nd and 3rd digits to the level of the heads of the proximal phalanges. 5. 

Incidentally noted is signal abnormality within the head of the 4th middle 

phalanx demonstrating decreased T1 signal and increased STIR signal which may 

represent prominent osteochondral change; however, acute infectious and or 

inflammatory changes at this level cannot be excluded.  Clinical correlation. 





Labs:


* Wound culture: S. Morganni 





Medications:


* Vancomycin IV 1gm Q12H (11/19/18)--> 1.67gm IV Q12H due to Vanc trough goal of

  15-20


* Zosyn 3.375gm IV Q8H (Started 11/15/18)----> discontinued 11/21/18


* Cefepime 1gm IV Q12H ( Started 11/21/18)


* Multivitamin 1 tab PO daily 


* Vitamin C 500mg PO daily 


* Florastor 250mg PO BID 


* Gabapentin 300mg PO HS 


Status: Acute   





(2) Hypertension


Assessment & Plan: 


Norvasc 5mg PO daily 


Status: Acute   





(3) Prophylactic measure


Assessment & Plan: 


GI: Not indicated 


DVT: Lovenox 40mg SC daily 








Disposition: 


[place] [facility] 


Patient to complete 6 weeks of Vancomycin 1.7gm IV Q12H and Cefepime 1 gm IV Q12

H. Patient has only completed 4 days of Vancomycine and 2.5 days of Cefepime 

over the course of admission at [facility]. Please ensure a total of 6 

weeks course of mentioned antibiotics are completed as per Dr. Del Castillo for left 

Hallux osteomyelitis 


Please check CMP, CMP and Vanc trough level Q before every fourth dose or 

weekly. Please keep in mind the goal of vancomycin trough for the treatment of 

Osteomyelitis is 15-20, therefore, vancomycin dose should be adjusted 

accordingly. 


Please follow up with Dr. Basilio Podiatry 


Wound care daily as instructed; Wound dressed with hydrogen peroxide soaked 

gauze





Please continue the rest of your medication as instructed:


Norvasc 5mg PO daily


Vitamin C 500mg PO daily 


Multivitamin 1 tab PO daily 


Gabapentin 300mg PO HS


Florastor 250mg PO BID or any other probiotics over the course of antibiotics 

treatment 





Please take care 








All plans and management discussed with Dr. Kam caceres 


Status: Acute

## 2018-11-23 NOTE — CP.PCM.PN
Subjective





- Date & Time of Evaluation


Date of Evaluation: 11/23/18


Time of Evaluation: 10:39





- Subjective


Subjective: 


Podiatry Progress Note for Dr. Figueroa





58M seen at bedside for left hallux ulceration. Patient is AAO x 3 and NAD, 

resting comfortably in bed. Denies any pain to foot, new pedal complaints or 

overnight events. Denies any recent N/V/F/C/CP/SOB/D








Objective





- Vital Signs/Intake and Output


Vital Signs (last 24 hours): 


                                        











Temp Pulse Resp BP Pulse Ox


 


 97.9 F   62   20   124/77   98 


 


 11/23/18 07:00  11/23/18 07:00  11/23/18 07:00  11/23/18 07:00  11/23/18 07:00








Intake and Output: 


                                        











 11/23/18 11/23/18





 06:59 18:59


 


Intake Total 650 


 


Output Total 600 


 


Balance 50 














- Medications


Medications: 


                               Current Medications





Acetaminophen (Tylenol 325mg Tab)  650 mg PO Q6 PRN


   PRN Reason: Fever >100.4 F


   Last Admin: 11/16/18 17:47 Dose:  650 mg


Amlodipine Besylate (Norvasc)  5 mg PO DAILY [facility]


   Last Admin: 11/23/18 09:50 Dose:  5 mg


Ascorbic Acid (Vitamin C 500 Mg Tab)  500 mg PO DAILY [facility]


   Last Admin: 11/23/18 09:50 Dose:  500 mg


Fluticasone Propionate (Flonase)  0 spr DAVE BID [facility]


   Last Admin: 11/23/18 09:50 Dose:  Not Given


Gabapentin (Neurontin)  300 mg PO [facility]


   Last Admin: 11/22/18 21:35 Dose:  300 mg


Vancomycin HCl 1 gm/ Sodium (Chloride)  250 mls @ 166.7 mls/hr IVPB Q12H [facility]; 

Protocol


   Last Admin: 11/23/18 04:00 Dose:  166.7 mls/hr


Cefepime HCl (Maxipime Iv 1 Gm Premix)  1 gm in 50 mls @ 100 mls/hr IVPB Q12H 

[facility]; Protocol


   Last Admin: 11/23/18 03:50 Dose:  100 mls/hr


Multivitamins (Hexavitamin)  1 tab PO DAILY [facility]


   Last Admin: 11/23/18 09:50 Dose:  1 tab


Saccharomyces Boulardii (Florastor)  250 mg PO BID [facility]


   Last Admin: 11/23/18 09:50 Dose:  250 mg











- Labs


Labs: 


                                        





                                 11/23/18 06:47 





                                 11/23/18 06:47 





                                        











PT  11.2 SECONDS (9.7-12.2)   11/16/18  11:24    


 


INR  1.0   11/16/18  11:24    


 


APTT  29 SECONDS (21-34)   11/16/18  11:24    














- Constitutional


Appears: Well, Non-toxic





- Head Exam


Head Exam: ATRAUMATIC





- Extremities Exam


Additional comments: 


LLE focused exam:





Vasc: DP/PT pulses palpable 2/4. Skin temperature warm to warm from proximal to 

distal WNL. CFT < 3 seconds to all digits. Minimal edema noted to hallux


Neuro: Epicritic and protective sensation grossly diminished


Derm: Superficial ulceration with partial overlying callous formation noted to 

plantar aspect of left hallux. No drainage appreciated. No malodor, probe to 

bone, tracking, tunneling or undermining. No other clinical signs of infection 

appreciated


MSK: Minimal POP to plantar aspect of hallux. Previous partial amputation of 

left second and third digits appreciated








- Psychiatric Exam


Psychiatric exam: Normal Affect





- Skin


Skin Exam: Normal Color





Assessment and Plan





- Assessment and Plan (Free Text)


Assessment: 


58M seen at bedside for left hallux ulceration





Plan: 


Patient seen and evaluated


Plan discussed with Dr. Figueroa


Afebrile, absent leukocytosis


Continue IV abx per ID


Wound cx toe: Morg Pacoanii Ss Morganii


Foot xray: On the lateral view of the great toe plantar cortical irregularities 

concerning for contiguous osteomyelitis and contiguous cellulitis is suspect.  

Correlate clinically.  The frontal view shows prominent exostosis of the medial 

great toe distal phalanx


ESR: 9


LE arterial US: No evidence of hemodynamic insufficiency to b/l LE


LE MRI: Evidence suggestive of OM to distal phalanx of left hallux appreciated


Plan to treat with long term IV abx


Wound dressed with hydrogen peroxide soaked gauze


No plan for surgical intervention at this time


Podiatry will continue to follow while patient in house

## 2018-12-05 ENCOUNTER — HOSPITAL ENCOUNTER (OUTPATIENT)
Dept: HOSPITAL 31 - C.SDS | Age: 58
Discharge: HOME | End: 2018-12-05
Attending: PODIATRIST
Payer: COMMERCIAL

## 2018-12-05 VITALS — SYSTOLIC BLOOD PRESSURE: 130 MMHG | DIASTOLIC BLOOD PRESSURE: 66 MMHG | HEART RATE: 78 BPM

## 2018-12-05 VITALS — TEMPERATURE: 97.8 F

## 2018-12-05 VITALS — BODY MASS INDEX: 25 KG/M2

## 2018-12-05 VITALS — RESPIRATION RATE: 18 BRPM | OXYGEN SATURATION: 100 %

## 2018-12-05 DIAGNOSIS — I73.9: ICD-10-CM

## 2018-12-05 DIAGNOSIS — M86.172: ICD-10-CM

## 2018-12-05 DIAGNOSIS — M86.672: Primary | ICD-10-CM

## 2018-12-05 DIAGNOSIS — L97.529: ICD-10-CM

## 2018-12-05 DIAGNOSIS — I10: ICD-10-CM

## 2018-12-05 PROCEDURE — 73630 X-RAY EXAM OF FOOT: CPT

## 2018-12-05 PROCEDURE — 28124 PARTIAL REMOVAL OF TOE: CPT

## 2018-12-05 RX ADMIN — HYDROMORPHONE HYDROCHLORIDE PRN MG: 1 INJECTION, SOLUTION INTRAMUSCULAR; INTRAVENOUS; SUBCUTANEOUS at 10:34

## 2018-12-05 RX ADMIN — HYDROMORPHONE HYDROCHLORIDE PRN MG: 1 INJECTION, SOLUTION INTRAMUSCULAR; INTRAVENOUS; SUBCUTANEOUS at 11:00

## 2018-12-05 NOTE — PCM.SURG1
Surgeon's Initial Post Op Note





- Surgeon's Notes


Surgeon: Dr. Figueroa, DPM 


Assistant: Dr. Tasia Fernandez, PGY1 


Type of Anesthesia: IV Sedation


Pre-Operative Diagnosis: Chronic ulcer L great toe, osteomyelitis


Operative Findings: See dicatation:  I: none.  M: 3-0 Chromic gut, 3-0 Nylon


Post-Operative Diagnosis: Same


Operation Performed: Partial ostectomy L great toe


Specimen/Specimens Removed: None


Estimated Blood Loss: EBL {In ML}: 5


Blood Products Given: N/A


Drains Used: No Drains


Post-Op Condition: Good


Date of Surgery/Procedure: 12/05/18


Time of Surgery/Procedure: 10:14

## 2018-12-05 NOTE — RAD
Date of service: 



12/05/2018



PROCEDURE:  Left Foot Radiographs.



HISTORY:

 s/p L foot surgery 



COMPARISON:

11/15/2018



FINDINGS:



BONES:

No acute fracture.  Possible interval bunionectomy.  Status post 

amputation 2nd and 3rd digit at the distal aspect of the proximal 

phalanx. Productive bony change seen at the base of the 1st distal 

phalanx. No change from prior. 



JOINTS:

Normal. 



SOFT TISSUES:

Normal. 



OTHER FINDINGS:

None.



IMPRESSION:

Status post bunionectomy. Prior amputation 2nd and 3rd digit as 

above. No plain radiographic evidence of osteomyelitis.

## 2018-12-11 NOTE — PCM.OP
Operative Report





- Operative Report


Date of Surgery/Procedure: 12/05/18


Time of Surgery/Procedure: 10:45


Surgeon: Dr. Carolina DPM


Assistant: Dr. Tasia Fernandez PGY1


Anesthesia/Sedation: 


 IV sedation with local 


Pre-Operative Diagnosis: 


Chronic ulcer L great toe, osteomyelitis


Post-Operative Diagnosis: 


Chronic ulcer L great toe, osteomyelitis


Indication for Surgery: 


The patient is a 58 year-old male with the above diagnoses.  The patient has 

exhausted all conservative treatment at this time and now requests surgical 

intervention.  The patient signed the consent after careful explanation of 

risks, benefits, complication and alternatives for surgical procedure.  No 

guarantees were given nor implied.


Operative Findings: 


Exostosis of distal phalanx of hallux with plantar protrusion


Procedure/Operation Description: 


Preparation: The patient was brought in to the operating room and placed on the 

operating room table in a supine position. Timeout was performed for 

identification of the correct patient and procedure. The patient received a 

total of mixture of a 1:1 mix of 1% lidocaine plain and 0.5% marcaine plain in a

local block type fashion to the left hallux. Once local anesthesia was achieved,

the left foot was then prepped and draped in normal sterile manner. 





Procedure 1: Partial ostectomy left great toe





Attention was then directed to the medial aspect of the hallux and an 

approximately 4 cm linear incision was made with a number 15 blade. The incision

was deepened through the subcutaneous tissues using sharp and blunt dissection. 

Care was taken to identify and retract all vital neurovascular structures. All 

bleeders were cauterized and ligated as necessary.





Utilizing a reciprocal nick the bony prominence located to the plantar aspect of

the hallux was debrided and all rough edges were then smoothed down. Next, a 

ronguer was utilized to remove the remaining osseous accessory bone from the 

plantar aspect of the hallux. Correction of the deformity was assessed at this 

time and was noted to be excellent. The surgical site was then flushed with 

copious amount of sterile normal saline solution.  Next, the subcutaneous tissue

was reapproximated with 3-0 chromic gut.  The skin was then reapproximated and 

coapted utilizing #3-0 nylon in a simple suture technique.  Wound was dressed 

with adaptic, DSD, and ACE. 


Estimated Blood Loss: 


5


Complications: 


None


Discharge & Condition: 


Postoperative Condition:  The patient tolerated the anesthesia and procedure 

well and was escorted to the recovery room with vital signs stable and 

neurovascular status intact to the right and left foot.  This patient will 

follow up with Dr. Figueroa in office in 1 week.